# Patient Record
Sex: MALE | Race: OTHER | NOT HISPANIC OR LATINO
[De-identification: names, ages, dates, MRNs, and addresses within clinical notes are randomized per-mention and may not be internally consistent; named-entity substitution may affect disease eponyms.]

---

## 2017-01-20 ENCOUNTER — APPOINTMENT (OUTPATIENT)
Dept: OPHTHALMOLOGY | Facility: CLINIC | Age: 77
End: 2017-01-20

## 2017-01-20 RX ORDER — METHOTREXATE 2.5 MG/1
2.5 TABLET ORAL
Qty: 20 | Refills: 3 | Status: ACTIVE | COMMUNITY
Start: 2017-01-20 | End: 1900-01-01

## 2017-01-20 RX ORDER — TIMOLOL MALEATE 5 MG/ML
0.5 SOLUTION OPHTHALMIC
Qty: 1 | Refills: 5 | Status: ACTIVE | COMMUNITY
Start: 2017-01-20 | End: 1900-01-01

## 2017-01-23 ENCOUNTER — RX RENEWAL (OUTPATIENT)
Age: 77
End: 2017-01-23

## 2017-01-24 ENCOUNTER — RX RENEWAL (OUTPATIENT)
Age: 77
End: 2017-01-24

## 2017-01-24 RX ORDER — METHOTREXATE 2.5 MG/1
2.5 TABLET ORAL
Qty: 20 | Refills: 3 | Status: ACTIVE | COMMUNITY
Start: 2017-01-24 | End: 1900-01-01

## 2017-02-27 ENCOUNTER — APPOINTMENT (OUTPATIENT)
Dept: OPHTHALMOLOGY | Facility: CLINIC | Age: 77
End: 2017-02-27

## 2017-03-27 ENCOUNTER — APPOINTMENT (OUTPATIENT)
Dept: OPHTHALMOLOGY | Facility: CLINIC | Age: 77
End: 2017-03-27

## 2017-05-01 ENCOUNTER — APPOINTMENT (OUTPATIENT)
Dept: OPHTHALMOLOGY | Facility: CLINIC | Age: 77
End: 2017-05-01

## 2017-06-05 ENCOUNTER — APPOINTMENT (OUTPATIENT)
Dept: OPHTHALMOLOGY | Facility: CLINIC | Age: 77
End: 2017-06-05

## 2017-07-17 ENCOUNTER — APPOINTMENT (OUTPATIENT)
Dept: OPHTHALMOLOGY | Facility: CLINIC | Age: 77
End: 2017-07-17

## 2017-07-19 ENCOUNTER — OUTPATIENT (OUTPATIENT)
Dept: OUTPATIENT SERVICES | Facility: HOSPITAL | Age: 77
LOS: 1 days | Discharge: HOME | End: 2017-07-19

## 2017-07-19 ENCOUNTER — APPOINTMENT (OUTPATIENT)
Dept: OPHTHALMOLOGY | Facility: AMBULATORY SURGERY CENTER | Age: 77
End: 2017-07-19

## 2017-07-20 ENCOUNTER — APPOINTMENT (OUTPATIENT)
Dept: OPHTHALMOLOGY | Facility: CLINIC | Age: 77
End: 2017-07-20

## 2017-08-18 ENCOUNTER — APPOINTMENT (OUTPATIENT)
Dept: OPHTHALMOLOGY | Facility: CLINIC | Age: 77
End: 2017-08-18
Payer: MEDICARE

## 2017-08-18 PROCEDURE — 92012 INTRM OPH EXAM EST PATIENT: CPT

## 2017-09-05 ENCOUNTER — APPOINTMENT (OUTPATIENT)
Dept: OPHTHALMOLOGY | Facility: AMBULATORY SURGERY CENTER | Age: 77
End: 2017-09-05
Payer: MEDICARE

## 2017-09-05 ENCOUNTER — OUTPATIENT (OUTPATIENT)
Dept: OUTPATIENT SERVICES | Facility: HOSPITAL | Age: 77
LOS: 1 days | Discharge: HOME | End: 2017-09-05

## 2017-09-05 PROCEDURE — 66984 XCAPSL CTRC RMVL W/O ECP: CPT | Mod: RT

## 2017-09-05 PROCEDURE — 68340 SEPARATE EYELID ADHESIONS: CPT | Mod: RT

## 2017-09-06 ENCOUNTER — APPOINTMENT (OUTPATIENT)
Dept: OPHTHALMOLOGY | Facility: CLINIC | Age: 77
End: 2017-09-06
Payer: MEDICARE

## 2017-09-06 PROCEDURE — 99024 POSTOP FOLLOW-UP VISIT: CPT

## 2017-09-06 RX ORDER — PREDNISOLONE ACETATE 10 MG/ML
1 SUSPENSION/ DROPS OPHTHALMIC 4 TIMES DAILY
Qty: 1 | Refills: 5 | Status: ACTIVE | COMMUNITY
Start: 2017-09-06 | End: 1900-01-01

## 2017-09-06 RX ORDER — MOXIFLOXACIN OPHTHALMIC 5 MG/ML
0.5 SOLUTION/ DROPS OPHTHALMIC 3 TIMES DAILY
Qty: 1 | Refills: 5 | Status: ACTIVE | COMMUNITY
Start: 2017-09-06 | End: 1900-01-01

## 2017-09-08 DIAGNOSIS — Z79.84 LONG TERM (CURRENT) USE OF ORAL HYPOGLYCEMIC DRUGS: ICD-10-CM

## 2017-09-08 DIAGNOSIS — H11.231 SYMBLEPHARON, RIGHT EYE: ICD-10-CM

## 2017-09-08 DIAGNOSIS — L12.1 CICATRICIAL PEMPHIGOID: ICD-10-CM

## 2017-09-08 DIAGNOSIS — E11.36 TYPE 2 DIABETES MELLITUS WITH DIABETIC CATARACT: ICD-10-CM

## 2017-09-21 ENCOUNTER — APPOINTMENT (OUTPATIENT)
Dept: OPHTHALMOLOGY | Facility: CLINIC | Age: 77
End: 2017-09-21
Payer: MEDICARE

## 2017-09-21 PROCEDURE — 99024 POSTOP FOLLOW-UP VISIT: CPT

## 2017-09-21 RX ORDER — METHOTREXATE 2.5 MG/1
2.5 TABLET ORAL
Qty: 20 | Refills: 3 | Status: ACTIVE | COMMUNITY
Start: 2017-01-23 | End: 1900-01-01

## 2017-10-12 ENCOUNTER — APPOINTMENT (OUTPATIENT)
Dept: OPHTHALMOLOGY | Facility: CLINIC | Age: 77
End: 2017-10-12
Payer: MEDICARE

## 2017-10-12 PROCEDURE — 99024 POSTOP FOLLOW-UP VISIT: CPT

## 2017-11-09 ENCOUNTER — APPOINTMENT (OUTPATIENT)
Dept: OPHTHALMOLOGY | Facility: CLINIC | Age: 77
End: 2017-11-09
Payer: MEDICARE

## 2017-11-09 PROCEDURE — 99024 POSTOP FOLLOW-UP VISIT: CPT

## 2017-12-08 ENCOUNTER — APPOINTMENT (OUTPATIENT)
Dept: OPHTHALMOLOGY | Facility: CLINIC | Age: 77
End: 2017-12-08
Payer: MEDICARE

## 2017-12-08 DIAGNOSIS — H26.9 UNSPECIFIED CATARACT: ICD-10-CM

## 2017-12-08 PROCEDURE — 67820 REVISE EYELASHES: CPT | Mod: 50

## 2017-12-08 PROCEDURE — 92014 COMPRE OPH EXAM EST PT 1/>: CPT | Mod: 25

## 2017-12-11 PROBLEM — H26.9 CATARACT, BILATERAL: Status: RESOLVED | Noted: 2017-01-20 | Resolved: 2017-12-11

## 2018-01-05 ENCOUNTER — APPOINTMENT (OUTPATIENT)
Dept: OPHTHALMOLOGY | Facility: CLINIC | Age: 78
End: 2018-01-05
Payer: MEDICARE

## 2018-01-05 PROCEDURE — 92012 INTRM OPH EXAM EST PATIENT: CPT | Mod: 25

## 2018-01-05 PROCEDURE — 67820 REVISE EYELASHES: CPT | Mod: 50

## 2018-02-02 ENCOUNTER — APPOINTMENT (OUTPATIENT)
Dept: OPHTHALMOLOGY | Facility: CLINIC | Age: 78
End: 2018-02-02
Payer: MEDICARE

## 2018-02-02 PROCEDURE — 67820 REVISE EYELASHES: CPT | Mod: 50

## 2018-02-02 PROCEDURE — 92012 INTRM OPH EXAM EST PATIENT: CPT | Mod: 25

## 2018-03-02 ENCOUNTER — APPOINTMENT (OUTPATIENT)
Dept: OPHTHALMOLOGY | Facility: CLINIC | Age: 78
End: 2018-03-02
Payer: MEDICARE

## 2018-03-02 PROCEDURE — 92012 INTRM OPH EXAM EST PATIENT: CPT | Mod: 25

## 2018-03-02 PROCEDURE — 67820 REVISE EYELASHES: CPT | Mod: 50

## 2018-03-26 ENCOUNTER — APPOINTMENT (OUTPATIENT)
Dept: NEUROSURGERY | Facility: CLINIC | Age: 78
End: 2018-03-26
Payer: MEDICARE

## 2018-03-26 PROCEDURE — 99203 OFFICE O/P NEW LOW 30 MIN: CPT

## 2018-03-27 ENCOUNTER — INPATIENT (INPATIENT)
Facility: HOSPITAL | Age: 78
LOS: 2 days | Discharge: ANOTHER HOSPITAL | End: 2018-03-30
Attending: NEUROLOGICAL SURGERY

## 2018-03-27 VITALS
SYSTOLIC BLOOD PRESSURE: 166 MMHG | WEIGHT: 108.03 LBS | RESPIRATION RATE: 18 BRPM | HEIGHT: 60 IN | DIASTOLIC BLOOD PRESSURE: 69 MMHG | HEART RATE: 72 BPM | TEMPERATURE: 98 F

## 2018-03-27 DIAGNOSIS — S32.030G: ICD-10-CM

## 2018-03-27 RX ORDER — ACETAMINOPHEN 500 MG
650 TABLET ORAL EVERY 6 HOURS
Qty: 0 | Refills: 0 | Status: DISCONTINUED | OUTPATIENT
Start: 2018-03-27 | End: 2018-03-29

## 2018-03-27 RX ORDER — TIMOLOL 0.5 %
0 DROPS OPHTHALMIC (EYE)
Qty: 0 | Refills: 0 | COMMUNITY

## 2018-03-27 NOTE — H&P ADULT - NSHPPHYSICALEXAM_GEN_ALL_CORE
Vital Signs Last 24 Hrs  T(C): 36.5 (27 Mar 2018 20:12), Max: 36.5 (27 Mar 2018 20:12)  T(F): 97.7 (27 Mar 2018 20:12), Max: 97.7 (27 Mar 2018 20:12)  HR: 72 (27 Mar 2018 20:12) (72 - 72)  BP: 166/69 (27 Mar 2018 20:12) (166/69 - 166/69)  BP(mean): --  RR: 18 (27 Mar 2018 20:12) (18 - 18)  SpO2: --    PHYSICAL EXAM:    GENERAL: NAD, well-groomed, well-developed  HEAD:  Atraumatic, Normocephalic  EYES: EOMI, PERRLA, conjunctiva and sclera clear  NERVOUS SYSTEM:  Awake  alert oriented to self, place situation   Fund of knowledge, recent and remote memory are intact   Mood; normal affect   CN II-XII intact PERRRL, EOMI, no ptosis, no Nystagmus, normal shoulder shrug   Face symmetrical tongue is midline speech is clear and fluent  Motor: 5/5 UE/LE all muscle groups   Sensory: No deficit to light touch   Gait is not tested      EXTREMITIES:  2+ Peripheral Pulses, there is bilateral 1+ edema of legs      LABS:                RADIOLOGY & ADDITIONAL TESTS:

## 2018-03-27 NOTE — H&P ADULT - HISTORY OF PRESENT ILLNESS
Pt is 76 yo male with hx of Pemphygoid and long term corticosteroid use with osteopenia presents for elective repair of L3 spinal fx. Pt states on March 10, 2018 he attempted to lift his wife off of floor after she had a seizure.  When he attempted to lift her he felt sharp pain in lower back.  he went to his PMD and xrays showed he had a fx of his lumbar spine.  Since this time pt ambulates only short distances with cane.  Denies any urinary or fecal incontinence.  His pain has been controlled with tylenol every 8 hrs at home.

## 2018-03-28 LAB
ALBUMIN SERPL ELPH-MCNC: 3.1 G/DL — LOW (ref 3.5–5.2)
ALP SERPL-CCNC: 88 U/L — SIGNIFICANT CHANGE UP (ref 30–115)
ALT FLD-CCNC: 8 U/L — SIGNIFICANT CHANGE UP (ref 0–41)
ANION GAP SERPL CALC-SCNC: 9 MMOL/L — SIGNIFICANT CHANGE UP (ref 7–14)
APPEARANCE UR: CLEAR — SIGNIFICANT CHANGE UP
APTT BLD: 30.9 SEC — SIGNIFICANT CHANGE UP (ref 27–39.2)
AST SERPL-CCNC: 15 U/L — SIGNIFICANT CHANGE UP (ref 0–41)
BASOPHILS # BLD AUTO: 0.04 K/UL — SIGNIFICANT CHANGE UP (ref 0–0.2)
BASOPHILS NFR BLD AUTO: 0.4 % — SIGNIFICANT CHANGE UP (ref 0–1)
BILIRUB SERPL-MCNC: 0.3 MG/DL — SIGNIFICANT CHANGE UP (ref 0.2–1.2)
BILIRUB UR-MCNC: NEGATIVE — SIGNIFICANT CHANGE UP
BUN SERPL-MCNC: 10 MG/DL — SIGNIFICANT CHANGE UP (ref 10–20)
CALCIUM SERPL-MCNC: 8.1 MG/DL — LOW (ref 8.5–10.1)
CHLORIDE SERPL-SCNC: 102 MMOL/L — SIGNIFICANT CHANGE UP (ref 98–110)
CO2 SERPL-SCNC: 30 MMOL/L — SIGNIFICANT CHANGE UP (ref 17–32)
COLOR SPEC: YELLOW — SIGNIFICANT CHANGE UP
CREAT SERPL-MCNC: <0.5 MG/DL — LOW (ref 0.7–1.5)
DIFF PNL FLD: NEGATIVE — SIGNIFICANT CHANGE UP
EOSINOPHIL # BLD AUTO: 0.09 K/UL — SIGNIFICANT CHANGE UP (ref 0–0.7)
EOSINOPHIL NFR BLD AUTO: 0.9 % — SIGNIFICANT CHANGE UP (ref 0–8)
GLUCOSE SERPL-MCNC: 84 MG/DL — SIGNIFICANT CHANGE UP (ref 70–99)
GLUCOSE UR QL: NEGATIVE MG/DL — SIGNIFICANT CHANGE UP
HCT VFR BLD CALC: 37.8 % — LOW (ref 42–52)
HGB BLD-MCNC: 12.6 G/DL — LOW (ref 14–18)
IMM GRANULOCYTES NFR BLD AUTO: 0.5 % — HIGH (ref 0.1–0.3)
INR BLD: 1.11 RATIO — SIGNIFICANT CHANGE UP (ref 0.65–1.3)
KETONES UR-MCNC: 15
LEUKOCYTE ESTERASE UR-ACNC: NEGATIVE — SIGNIFICANT CHANGE UP
LYMPHOCYTES # BLD AUTO: 1.02 K/UL — LOW (ref 1.2–3.4)
LYMPHOCYTES # BLD AUTO: 10.3 % — LOW (ref 20.5–51.1)
MCHC RBC-ENTMCNC: 31.2 PG — HIGH (ref 27–31)
MCHC RBC-ENTMCNC: 33.3 G/DL — SIGNIFICANT CHANGE UP (ref 32–37)
MCV RBC AUTO: 93.6 FL — SIGNIFICANT CHANGE UP (ref 80–94)
MONOCYTES # BLD AUTO: 0.96 K/UL — HIGH (ref 0.1–0.6)
MONOCYTES NFR BLD AUTO: 9.7 % — HIGH (ref 1.7–9.3)
NEUTROPHILS # BLD AUTO: 7.75 K/UL — HIGH (ref 1.4–6.5)
NEUTROPHILS NFR BLD AUTO: 78.2 % — HIGH (ref 42.2–75.2)
NITRITE UR-MCNC: NEGATIVE — SIGNIFICANT CHANGE UP
PH UR: 7.5 — SIGNIFICANT CHANGE UP (ref 5–8)
PLATELET # BLD AUTO: 282 K/UL — SIGNIFICANT CHANGE UP (ref 130–400)
POTASSIUM SERPL-MCNC: 5.4 MMOL/L — HIGH (ref 3.5–5)
POTASSIUM SERPL-SCNC: 5.4 MMOL/L — HIGH (ref 3.5–5)
PROT SERPL-MCNC: 5.6 G/DL — LOW (ref 6–8)
PROT UR-MCNC: NEGATIVE MG/DL — SIGNIFICANT CHANGE UP
PROTHROM AB SERPL-ACNC: 12 SEC — SIGNIFICANT CHANGE UP (ref 9.95–12.87)
RBC # BLD: 4.04 M/UL — LOW (ref 4.7–6.1)
RBC # FLD: 14.6 % — HIGH (ref 11.5–14.5)
SODIUM SERPL-SCNC: 141 MMOL/L — SIGNIFICANT CHANGE UP (ref 135–146)
SP GR SPEC: 1.01 — SIGNIFICANT CHANGE UP (ref 1.01–1.03)
UROBILINOGEN FLD QL: 0.2 MG/DL — SIGNIFICANT CHANGE UP (ref 0.2–0.2)
WBC # BLD: 9.91 K/UL — SIGNIFICANT CHANGE UP (ref 4.8–10.8)
WBC # FLD AUTO: 9.91 K/UL — SIGNIFICANT CHANGE UP (ref 4.8–10.8)

## 2018-03-28 RX ORDER — HEPARIN SODIUM 5000 [USP'U]/ML
5000 INJECTION INTRAVENOUS; SUBCUTANEOUS EVERY 8 HOURS
Qty: 0 | Refills: 0 | Status: DISCONTINUED | OUTPATIENT
Start: 2018-03-28 | End: 2018-03-29

## 2018-03-28 RX ADMIN — Medication 650 MILLIGRAM(S): at 06:07

## 2018-03-28 RX ADMIN — HEPARIN SODIUM 5000 UNIT(S): 5000 INJECTION INTRAVENOUS; SUBCUTANEOUS at 15:21

## 2018-03-28 RX ADMIN — HEPARIN SODIUM 5000 UNIT(S): 5000 INJECTION INTRAVENOUS; SUBCUTANEOUS at 22:01

## 2018-03-28 RX ADMIN — Medication 650 MILLIGRAM(S): at 12:20

## 2018-03-28 RX ADMIN — Medication 650 MILLIGRAM(S): at 18:22

## 2018-03-28 NOTE — SWALLOW BEDSIDE ASSESSMENT ADULT - ASR SWALLOW RECOMMEND DIAG
SLP recommending instrumental swallow study however pt NPO for OR tomorrow. Outpatient information provided to daughter/VFSS/MBS/FEES

## 2018-03-28 NOTE — SWALLOW BEDSIDE ASSESSMENT ADULT - SWALLOW EVAL: DIAGNOSIS
Mild-moderate oral dysphagia for nectar thick liquids and puree with min overt s/s aspiration/penetration. Pt with baseline cough. Suspected pharyngeal dysphagia for thin liquids

## 2018-03-28 NOTE — PRE-ANESTHESIA EVALUATION ADULT - NSDENTALSD_ENT_ALL_CORE
partial lower dentures loose teeth/partial lower dentures, 5 upper loose teeth, patient explained that teeth may fall out during anesthesia, verbalized understanding

## 2018-03-28 NOTE — CONSULT NOTE ADULT - SUBJECTIVE AND OBJECTIVE BOX
Pt is 78 yo male with hx of Pemphygoid on MTX 7.5mg /week, DM-2 controlled with diet, glaucoma on timolol and long term corticosteroid use with osteopenia presents for elective repair of L3 spinal fx. Daughter states on March 10, 2018 he attempted to lift his wife off of floor after she had a seizure, he felt sharp pain in lower back. on seeing his his PMD xrays revealed compression fx of his lumbar spine.  Since this time pt ambulates only short distances with cane.  Denies any urinary or fecal incontinence.  His pain has been controlled with tylenol every 8 hrs at home. He has new onset B/L LE swelling since 2-3 days. Denies any pain in the LE, CP, SOB, palpitations, nausea, vomiting, focal weakness. Denies any cardiac history. Not on any antiplatelet or AC's. has been having bouts fo dry cough since a few months, mostly on eating. Currently being evaluated by speech and swallow for oropharyngeal dysphagia. Will have a FEES test after surgery. Denies any fever, chills. Baseline patient is independent with his ADL.     PAST MEDICAL & SURGICAL HISTORY  PAST MEDICAL & SURGICAL HISTORY:  Osteoporosis, unspecified osteoporosis type, unspecified pathological fracture presence, DM-2, glaucoma, pemphigoid    SOCIAL HISTORY:  Non smoker , no ETOH abuse   ALLERGIES:  No Known Allergies    MEDICATIONS:  STANDING MEDICATIONS  heparin  Injectable 5000 Unit(s) SubCutaneous every 8 hours    PRN MEDICATIONS  acetaminophen   Tablet 650 milliGRAM(s) Oral every 6 hours PRN    VITALS:   T(F): 97.4  HR: 65  BP: 117/56  RR: 18  SpO2: --    LABS:                        12.6   9.91  )-----------( 282      ( 28 Mar 2018 01:28 )             37.8         141  |  102  |  10  ----------------------------<  84  5.4<H>   |  30  |  <0.5<L>    Ca    8.1<L>      28 Mar 2018 01:28    TPro  5.6<L>  /  Alb  3.1<L>  /  TBili  0.3  /  DBili  x   /  AST  15  /  ALT  8   /  AlkPhos  88      PT/INR - ( 28 Mar 2018 01:28 )   PT: 12.00 sec;   INR: 1.11 ratio         PTT - ( 28 Mar 2018 01:28 )  PTT:30.9 sec  Urinalysis Basic - ( 28 Mar 2018 04:30 )    Color: Yellow / Appearance: Clear / S.010 / pH: x  Gluc: x / Ketone: 15  / Bili: Negative / Urobili: 0.2 mg/dL   Blood: x / Protein: Negative mg/dL / Nitrite: Negative   Leuk Esterase: Negative / RBC: x / WBC x   Sq Epi: x / Non Sq Epi: x / Bacteria: x    EKG:   Normal sinus rhythm  Right bundle branch block  Left anterior fascicular block    PHYSICAL EXAM:  GEN: No acute distress  HEENT:   LUNGS: Clear to auscultation bilaterally   HEART: S1/S2 present. RRR.   ABD: Soft, non-tender, non-distended. Bowel sounds present  EXT:  NEURO: AAOX3 Pt is 78 yo male with hx of Pemphygoid on MTX 7.5mg /week, DM-2 controlled with diet, glaucoma on timolol and long term corticosteroid use with osteopenia presents for elective repair of L3 spinal fx. Daughter states on March 10, 2018 he attempted to lift his wife off of floor after she had a seizure, he felt sharp pain in lower back. on seeing his his PMD xrays revealed compression fx of his lumbar spine.  Since this time pt ambulates only short distances with cane.  Denies any urinary or fecal incontinence.  His pain has been controlled with tylenol every 8 hrs at home. He has new onset B/L LE swelling since 2-3 days. Denies any pain in the LE, CP, SOB, palpitations, nausea, vomiting, focal weakness. Denies any cardiac history. Not on any antiplatelet or AC's. has been having bouts fo dry cough since a few months, mostly on eating. Currently being evaluated by speech and swallow for oropharyngeal dysphagia. Will have a FEES test after surgery. Denies any fever, chills. Baseline patient is independent with his ADL.     PAST MEDICAL & SURGICAL HISTORY  PAST MEDICAL & SURGICAL HISTORY:  Osteoporosis, unspecified osteoporosis type, unspecified pathological fracture presence, DM-2, glaucoma, pemphigoid    SOCIAL HISTORY:  Non smoker , no ETOH abuse   ALLERGIES:  No Known Allergies    MEDICATIONS:  STANDING MEDICATIONS  heparin  Injectable 5000 Unit(s) SubCutaneous every 8 hours    PRN MEDICATIONS  acetaminophen   Tablet 650 milliGRAM(s) Oral every 6 hours PRN    VITALS:   T(F): 97.4  HR: 65  BP: 117/56  RR: 18  SpO2: --    LABS:                        12.6   9.91  )-----------( 282      ( 28 Mar 2018 01:28 )             37.8         141  |  102  |  10  ----------------------------<  84  5.4<H>   |  30  |  <0.5<L>    Ca    8.1<L>      28 Mar 2018 01:28    TPro  5.6<L>  /  Alb  3.1<L>  /  TBili  0.3  /  DBili  x   /  AST  15  /  ALT  8   /  AlkPhos  88      PT/INR - ( 28 Mar 2018 01:28 )   PT: 12.00 sec;   INR: 1.11 ratio         PTT - ( 28 Mar 2018 01:28 )  PTT:30.9 sec  Urinalysis Basic - ( 28 Mar 2018 04:30 )    Color: Yellow / Appearance: Clear / S.010 / pH: x  Gluc: x / Ketone: 15  / Bili: Negative / Urobili: 0.2 mg/dL   Blood: x / Protein: Negative mg/dL / Nitrite: Negative   Leuk Esterase: Negative / RBC: x / WBC x   Sq Epi: x / Non Sq Epi: x / Bacteria: x    EKG:   Normal sinus rhythm @ 63bpm  Right bundle branch block  Left anterior fascicular block    PHYSICAL EXAM:  GEN: AAOX3, sitting up in chair in no acute distress   LUNGS: Clear to auscultation bilaterally   HEART: S1/S2 present. RRR.   ABD: Soft, non-tender, non-distended. Bowel sounds present  EXT: B/L P. edema 2 +  NEURO: AAOX3  5/5 B/L LE   LS tenderness +

## 2018-03-28 NOTE — SWALLOW BEDSIDE ASSESSMENT ADULT - SPECIFY REASON(S)
Daughter at bedside reports pt with chronic baseline cough, however concerns about increased coughing during meals

## 2018-03-28 NOTE — SWALLOW BEDSIDE ASSESSMENT ADULT - PHARYNGEAL PHASE
Wet vocal quality post oral intake/Cough post oral intake cough x1/Cough post oral intake Cough post oral intake/cough x1

## 2018-03-28 NOTE — CONSULT NOTE ADULT - ASSESSMENT
78 y/o M with glaucoma, pemphigoid, DM-2 controlled with diet for elective L3 kyphoplasty under local anesthesia with sedation    1) B/L LE swelling / Bifasicular block /  Hyperkalemia (5.4, likely pseudohyperkalemia)    - ECHO for evaluation of cardiac function as outpt  - Repeat potassium level  - 76 y/o M with glaucoma, pemphigoid, DM-2 controlled with diet for elective L3 kyphoplasty under local anesthesia with sedation    # B/L LE swelling / Bifasicular block /  Hyperkalemia (5.4, likely pseudohyperkalemia)    - ECHO for evaluation of cardiac function as outpt  - Repeat potassium level 78 y/o M with glaucoma, pemphigoid, DM-2 controlled with diet for elective L3 kyphoplasty under local anesthesia with sedation    # B/L LE swelling / Bifasicular block /  Hyperkalemia (5.4, likely pseudohyperkalemia)    - Repeat potassium level  - ECHO

## 2018-03-28 NOTE — PROGRESS NOTE ADULT - SUBJECTIVE AND OBJECTIVE BOX
HD #2     L3 Compression Fx    Pt seen and examined at bedside. Pt c/o LBP. Denies Lower Extremity Radiculopathy, Parasthesias, Weakness. c/o dysphagia. Sts has been going on for the last 4 weeks.    Vital Signs Last 24 Hrs  T(C): 36.3 (28 Mar 2018 08:28), Max: 36.5 (27 Mar 2018 20:12)  T(F): 97.4 (28 Mar 2018 08:28), Max: 97.7 (27 Mar 2018 20:12)  HR: 65 (28 Mar 2018 08:28) (65 - 80)  BP: 117/56 (28 Mar 2018 08:28) (117/56 - 166/69)  BP(mean): --  RR: 18 (28 Mar 2018 08:28) (18 - 18)  SpO2: --    PHYSICAL EXAM:  5/5 B/L Lower Extremities  Sensation intact to light touch  No clonus  +Tenderness to palpation of Lspine    MEDICATIONS:  Antibiotics:    Neuro:  acetaminophen   Tablet 650 milliGRAM(s) Oral every 6 hours PRN    Anticoagulation:  heparin  Injectable 5000 Unit(s) SubCutaneous every 8 hours    LABS:                        12.6   9.91  )-----------( 282      ( 28 Mar 2018 01:28 )             37.8     28    141  |  102  |  10  ----------------------------<  84  5.4<H>   |  30  |  <0.5<L>    Ca    8.1<L>      28 Mar 2018 01:28    TPro  5.6<L>  /  Alb  3.1<L>  /  TBili  0.3  /  DBili  x   /  AST  15  /  ALT  8   /  AlkPhos  88  28    PT/INR - ( 28 Mar 2018 01:28 )   PT: 12.00 sec;   INR: 1.11 ratio         PTT - ( 28 Mar 2018 01:28 )  PTT:30.9 sec  Urinalysis Basic - ( 28 Mar 2018 04:30 )    Color: Yellow / Appearance: Clear / S.010 / pH: x  Gluc: x / Ketone: 15  / Bili: Negative / Urobili: 0.2 mg/dL   Blood: x / Protein: Negative mg/dL / Nitrite: Negative   Leuk Esterase: Negative / RBC: x / WBC x   Sq Epi: x / Non Sq Epi: x / Bacteria: x    Assessment:  As above    Plan:  Awaiting MRI T & Lspine  Hospitalist for Medical Clearance  Tentative OR tomorrow for L3 Kyphoplasty under local with sedation  Speech and swallow eval for dysphagia

## 2018-03-29 ENCOUNTER — RESULT REVIEW (OUTPATIENT)
Age: 78
End: 2018-03-29

## 2018-03-29 LAB
ANION GAP SERPL CALC-SCNC: 8 MMOL/L — SIGNIFICANT CHANGE UP (ref 7–14)
BUN SERPL-MCNC: 10 MG/DL — SIGNIFICANT CHANGE UP (ref 10–20)
CALCIUM SERPL-MCNC: 8.1 MG/DL — LOW (ref 8.5–10.1)
CHLORIDE SERPL-SCNC: 100 MMOL/L — SIGNIFICANT CHANGE UP (ref 98–110)
CO2 SERPL-SCNC: 31 MMOL/L — SIGNIFICANT CHANGE UP (ref 17–32)
CREAT SERPL-MCNC: <0.5 MG/DL — LOW (ref 0.7–1.5)
GLUCOSE SERPL-MCNC: 117 MG/DL — HIGH (ref 70–99)
POTASSIUM SERPL-MCNC: 5.4 MMOL/L — HIGH (ref 3.5–5)
POTASSIUM SERPL-SCNC: 5.4 MMOL/L — HIGH (ref 3.5–5)
SODIUM SERPL-SCNC: 139 MMOL/L — SIGNIFICANT CHANGE UP (ref 135–146)

## 2018-03-29 RX ORDER — TIMOLOL 0.5 %
1 DROPS OPHTHALMIC (EYE) AT BEDTIME
Qty: 0 | Refills: 0 | Status: DISCONTINUED | OUTPATIENT
Start: 2018-03-29 | End: 2018-03-30

## 2018-03-29 RX ORDER — OXYCODONE AND ACETAMINOPHEN 5; 325 MG/1; MG/1
2 TABLET ORAL EVERY 4 HOURS
Qty: 0 | Refills: 0 | Status: DISCONTINUED | OUTPATIENT
Start: 2018-03-29 | End: 2018-03-30

## 2018-03-29 RX ORDER — SODIUM CHLORIDE 9 MG/ML
1000 INJECTION, SOLUTION INTRAVENOUS
Qty: 0 | Refills: 0 | Status: DISCONTINUED | OUTPATIENT
Start: 2018-03-29 | End: 2018-03-29

## 2018-03-29 RX ORDER — ONDANSETRON 8 MG/1
4 TABLET, FILM COATED ORAL ONCE
Qty: 0 | Refills: 0 | Status: DISCONTINUED | OUTPATIENT
Start: 2018-03-29 | End: 2018-03-29

## 2018-03-29 RX ORDER — OXYCODONE AND ACETAMINOPHEN 5; 325 MG/1; MG/1
2 TABLET ORAL EVERY 6 HOURS
Qty: 0 | Refills: 0 | Status: DISCONTINUED | OUTPATIENT
Start: 2018-03-29 | End: 2018-03-29

## 2018-03-29 RX ORDER — ONDANSETRON 8 MG/1
4 TABLET, FILM COATED ORAL ONCE
Qty: 0 | Refills: 0 | Status: DISCONTINUED | OUTPATIENT
Start: 2018-03-29 | End: 2018-03-30

## 2018-03-29 RX ORDER — OXYCODONE AND ACETAMINOPHEN 5; 325 MG/1; MG/1
1 TABLET ORAL EVERY 4 HOURS
Qty: 0 | Refills: 0 | Status: DISCONTINUED | OUTPATIENT
Start: 2018-03-29 | End: 2018-03-30

## 2018-03-29 RX ORDER — SODIUM CHLORIDE 9 MG/ML
1000 INJECTION, SOLUTION INTRAVENOUS
Qty: 0 | Refills: 0 | Status: DISCONTINUED | OUTPATIENT
Start: 2018-03-29 | End: 2018-03-30

## 2018-03-29 RX ORDER — ACETAMINOPHEN 500 MG
650 TABLET ORAL EVERY 6 HOURS
Qty: 0 | Refills: 0 | Status: DISCONTINUED | OUTPATIENT
Start: 2018-03-29 | End: 2018-03-30

## 2018-03-29 RX ADMIN — Medication 1 DROP(S): at 21:26

## 2018-03-29 RX ADMIN — SODIUM CHLORIDE 75 MILLILITER(S): 9 INJECTION, SOLUTION INTRAVENOUS at 18:11

## 2018-03-29 RX ADMIN — Medication 650 MILLIGRAM(S): at 05:52

## 2018-03-29 NOTE — PHYSICAL THERAPY INITIAL EVALUATION ADULT - GENERAL OBSERVATIONS, REHAB EVAL
Time in: 320 Time out: 350 pm Chart reviewed. Pt. seen semirecline in No apparent distress, c/o post op pain

## 2018-03-29 NOTE — CONSULT NOTE ADULT - SUBJECTIVE AND OBJECTIVE BOX
HPI:  Pt is 76 yo male with hx of Pemphygoid and long term corticosteroid use with osteopenia presents for elective repair of L3 spinal fx. Pt states on March 10, 2018 he attempted to lift his wife off of floor after she had a seizure.  When he attempted to lift her he felt sharp pain in lower back.  he went to his PMD and xrays showed he had a fx of his lumbar spine.  Since this time pt ambulates only short distances with cane.  Denies any urinary or fecal incontinence.  His pain has been controlled with tylenol every 8 hrs at home. (27 Mar 2018 20:59). S/p L3 kyphoplasty on 3/29.      PAST MEDICAL & SURGICAL HISTORY:  Osteoporosis, unspecified osteoporosis type, unspecified pathological fracture presence      Hospital Course:    TODAY'S SUBJECTIVE & REVIEW OF SYMPTOMS:     Constitutional WNL   Cardio WNL   Resp WNL   GI WNL  Heme WNL  Endo WNL  Skin WNL  MSK back pain  Neuro WNL  Cognitive WNL  Psych WNL      MEDICATIONS  (STANDING):  ondansetron Injectable 4 milliGRAM(s) IV Push once  timolol 0.5% Solution 1 Drop(s) Both EYES at bedtime    MEDICATIONS  (PRN):  acetaminophen   Tablet 650 milliGRAM(s) Oral every 6 hours PRN For Temp greater than 38.5 C (101.3 F)  oxyCODONE    5 mG/acetaminophen 325 mG 1 Tablet(s) Oral every 4 hours PRN Mild Pain (1 - 3)  oxyCODONE    5 mG/acetaminophen 325 mG 2 Tablet(s) Oral every 4 hours PRN Moderate Pain (4 - 6)      FAMILY HISTORY:      Allergies    No Known Allergies    Intolerances        SOCIAL HISTORY:    [  ] Etoh  [  ] Smoking  [  ] Substance abuse     Home Environment:  [  ] Home Alone  [ x ] Lives with Family  [  ] Home Health Aid    Dwelling:  [x  ] Apartment  [  ] Private House  [  ] Adult Home  [  ] Skilled Nursing Facility      [  ] Short Term  [  ] Long Term  [  ] Stairs       Elevator [ x ]    FUNCTIONAL STATUS PTA: (Check all that apply)  Ambulation: [  x ]Independent    [  ] Dependent     [  ] Non-Ambulatory  Assistive Device: [  ] SA Cane  [  ]  Q Cane  [  ] Walker  [  ]  Wheelchair  ADL : [x  ] Independent  [  ]  Dependent       Vital Signs Last 24 Hrs  T(C): 37.7 (29 Mar 2018 16:19), Max: 37.7 (29 Mar 2018 12:12)  T(F): 99.9 (29 Mar 2018 16:19), Max: 99.9 (29 Mar 2018 12:12)  HR: 107 (29 Mar 2018 16:19) (83 - 107)  BP: 117/66 (29 Mar 2018 16:19) (103/63 - 138/69)  BP(mean): --  RR: 18 (29 Mar 2018 16:19) (16 - 18)  SpO2: 96% (29 Mar 2018 12:42) (96% - 97%)      PHYSICAL EXAM: Alert & Oriented X3  GENERAL: NAD, well-groomed, well-developed  HEAD:  Atraumatic, Normocephalic  CHEST/LUNG: Clear   HEART: Regular   ABDOMEN: Soft, Nontender  EXTREMITIES:  no calf tenderness    NERVOUS SYSTEM:  Cranial Nerves 2-12 intact [  ] Abnormal  [  ]  ROM: WFL all extremities [x  ]  Abnormal [  ]  Motor Strength: WFL all extremities  [ x ]  Abnormal [  ]  Sensation: intact to light touch [ x ] Abnormal [  ]  Reflexes: Symmetric [  ]  Abnormal [  ]    FUNCTIONAL STATUS:  Bed Mobility: Independent [  ]  Supervision [  ]  Needs Assistance [x  ]  N/A [  ]  Transfers: Independent [  ]  Supervision [  ]  Needs Assistance [ x ]  N/A [  ]   Ambulation: Independent [  ]  Supervision [  ]  Needs Assistance [ x ]  N/A [  ]  ADL: Independent [  ] Requires Assistance [  ] N/A [  ]      LABS:                        12.6   9.91  )-----------( 282      ( 28 Mar 2018 01:28 )             37.8     03-29    139  |  100  |  10  ----------------------------<  117<H>  5.4<H>   |  31  |  <0.5<L>    Ca    8.1<L>      29 Mar 2018 09:30    TPro  5.6<L>  /  Alb  3.1<L>  /  TBili  0.3  /  DBili  x   /  AST  15  /  ALT  8   /  AlkPhos  88  -28    PT/INR - ( 28 Mar 2018 01:28 )   PT: 12.00 sec;   INR: 1.11 ratio         PTT - ( 28 Mar 2018 01:28 )  PTT:30.9 sec  Urinalysis Basic - ( 28 Mar 2018 04:30 )    Color: Yellow / Appearance: Clear / S.010 / pH: x  Gluc: x / Ketone: 15  / Bili: Negative / Urobili: 0.2 mg/dL   Blood: x / Protein: Negative mg/dL / Nitrite: Negative   Leuk Esterase: Negative / RBC: x / WBC x   Sq Epi: x / Non Sq Epi: x / Bacteria: x        RADIOLOGY & ADDITIONAL STUDIES:    Assesment:

## 2018-03-29 NOTE — CONSULT NOTE ADULT - ASSESSMENT
IMPRESSION: Rehab of L3 comp fx, s/p kyphoplasty    PRECAUTIONS: [  ] Cardiac  [  ] Respiratory  [  ] Seizures [  ] Contact Isolation  [  ] Droplet Isolation  [  ] Other    Weight Bearing Status:     RECOMMENDATION:    Out of Bed to Chair     DVT/Decubiti Prophylaxis    REHAB PLAN:     [ x  ] Bedside P/T 3-5 times a week   [   ]   Bedside O/T  2-3 times a week             [   ] No Rehab Therapy Indicated                   [   ]  Speech Therapy   Conditioning/ROM                                    ADL  Bed Mobility                                               Conditioning/ROM  Transfers                                                     Bed Mobility  Sitting /Standing Balance                         Transfers                                        Gait Training                                               Sitting/Standing Balance  Stair Training [   ]Applicable                    Home equipment Eval                                                                        Splinting  [   ] Only      GOALS:   ADL   [   ]   Independent                    Transfers  [ x  ] Independent                          Ambulation  [ x  ] Independent     [  x  ] With device                            [   ]  CG                                                         [   ]  CG                                                                  [   ] CG                            [    ] Min A                                                   [   ] Min A                                                              [   ] Min  A          DISCHARGE PLAN:   [   ]  Good candidate for Intensive Rehabilitation/Hospital based-4A [facility]                                             Will tolerate 3hrs Intensive Rehab Daily                                       [    ]  Short Term Rehab in Skilled Nursing Facility                                       [ x   ]  Home with Outpatient or  services                                         [    ]  Possible Candidate for Intensive Hospital based Rehab

## 2018-03-29 NOTE — PROGRESS NOTE ADULT - SUBJECTIVE AND OBJECTIVE BOX
NEUROSURGERY POST OP NOTE:    S/P L3 Kyphoplasty    Pt seen and examined at bedside. Pt c/o some incisional pain. Sts improved from pre-op. Denies Lower extremity radiculopathy, parasthesias. Daughter sts pt with more difficulty swallowing now. Pt did not have GETA. Pt had local anesthesia with sedation.    T(C): 37.7 (03-29-18 @ 16:19), Max: 37.7 (03-29-18 @ 12:12)  HR: 107 (03-29-18 @ 16:19) (83 - 107)  BP: 117/66 (03-29-18 @ 16:19) (103/63 - 138/69)  RR: 18 (03-29-18 @ 16:19) (16 - 18)  SpO2: 96% (03-29-18 @ 12:42) (96% - 97%)      03-28-18 @ 07:01  -  03-29-18 @ 07:00  --------------------------------------------------------  IN: 0 mL / OUT: 350 mL / NET: -350 mL    03-29-18 @ 07:01  -  03-29-18 @ 16:21  --------------------------------------------------------  IN: 100 mL / OUT: 100 mL / NET: 0 mL        acetaminophen   Tablet 650 milliGRAM(s) Oral every 6 hours PRN  ondansetron Injectable 4 milliGRAM(s) IV Push once  oxyCODONE    5 mG/acetaminophen 325 mG 1 Tablet(s) Oral every 4 hours PRN  oxyCODONE    5 mG/acetaminophen 325 mG 2 Tablet(s) Oral every 4 hours PRN  timolol 0.5% Solution 1 Drop(s) Both EYES at bedtime      Exam:  Strength 5/5  Sensation intact to light touch  No clonus  Incisions C/D/I    Assessment:   As above    Plan:  PT  NPO per speech and swallow  Barium Swallow ordered for tomorrow.

## 2018-03-29 NOTE — CHART NOTE - NSCHARTNOTEFT_GEN_A_CORE
PACU ANESTHESIA ADMISSION NOTE      Procedure:   Post op diagnosis:      ____  Intubated  TV:______       Rate: ______      FiO2: ______    ____  Patent Airway    ____  Full return of protective reflexes    _x___  Full recovery from anesthesia / back to baseline     Vitals:   T99.9:           R: 12                 BP:  103/63                Sat:   96                P: 104      Mental Status:  ____x Awake   ____x_ Alert   _____ Drowsy   _____ Sedated    Nausea/Vomiting:  __x__ NO  ______Yes,   See Post - Op Orders          Pain Scale (0-10):  _____    Treatment: ____ None    _x___ See Post - Op/PCA Orders    Post - Operative Fluids:   ____ Oral   __x__ See Post - Op Orders    Plan: Discharge:   ____Home       __x___Floor     _____Critical Care    _____  Other:_________________    Comments:

## 2018-03-30 ENCOUNTER — TRANSCRIPTION ENCOUNTER (OUTPATIENT)
Age: 78
End: 2018-03-30

## 2018-03-30 VITALS
DIASTOLIC BLOOD PRESSURE: 64 MMHG | TEMPERATURE: 98 F | RESPIRATION RATE: 18 BRPM | HEART RATE: 74 BPM | SYSTOLIC BLOOD PRESSURE: 119 MMHG

## 2018-03-30 LAB — SURGICAL PATHOLOGY STUDY: SIGNIFICANT CHANGE UP

## 2018-03-30 RX ORDER — PANTOPRAZOLE SODIUM 20 MG/1
40 TABLET, DELAYED RELEASE ORAL DAILY
Qty: 0 | Refills: 0 | Status: DISCONTINUED | OUTPATIENT
Start: 2018-03-30 | End: 2018-03-30

## 2018-03-30 RX ORDER — DEXAMETHASONE 0.5 MG/5ML
6 ELIXIR ORAL ONCE
Qty: 0 | Refills: 0 | Status: COMPLETED | OUTPATIENT
Start: 2018-03-30 | End: 2018-03-30

## 2018-03-30 RX ORDER — ACETAMINOPHEN 500 MG
2 TABLET ORAL
Qty: 0 | Refills: 0 | COMMUNITY
Start: 2018-03-30

## 2018-03-30 RX ADMIN — SODIUM CHLORIDE 75 MILLILITER(S): 9 INJECTION, SOLUTION INTRAVENOUS at 06:59

## 2018-03-30 RX ADMIN — Medication 6 MILLIGRAM(S): at 12:01

## 2018-03-30 NOTE — DISCHARGE NOTE ADULT - NS AS ACTIVITY OBS
No Heavy lifting/straining/Walking-Indoors allowed/Do not drive or operate machinery/Walking-Outdoors allowed/Showering allowed

## 2018-03-30 NOTE — DISCHARGE NOTE ADULT - MEDICATION SUMMARY - MEDICATIONS TO TAKE
I will START or STAY ON the medications listed below when I get home from the hospital:    Methyltrexate  -- 2.5 milligram(s) enteral once a week  -- Indication: For home med    acetaminophen 325 mg oral tablet  -- 2 tab(s) by mouth every 6 hours, As needed, For Temp greater than 38.5 C (101.3 F)  -- Indication: For pain    timolol hemihydrate 0.5% ophthalmic solution  -- drop(s) to each affected eye 2 times a day  -- Indication: For home med

## 2018-03-30 NOTE — DISCHARGE NOTE ADULT - CARE PROVIDER_API CALL
Kanika Tyler), Neurological Surgery  [address] [place] [address] [address]  [place] [address]  Phone: [phone]  Fax: [phone]

## 2018-03-30 NOTE — DISCHARGE NOTE ADULT - PATIENT PORTAL LINK FT
You can access the [facility] Patient Portal, offered by [facility], by registering with the following website: [email or web address]

## 2018-03-30 NOTE — SWALLOW FEES ASSESSMENT ADULT - SLP PERTINENT HISTORY OF CURRENT PROBLEM
Pt admitted with lumbar spine fracture s/p sx 3/29. PMHx: Pemphigold (dx ~4 years ago). Pt and daughter report pt with difficulty swallowing for past several months

## 2018-03-30 NOTE — SWALLOW FEES ASSESSMENT ADULT - ROSENBEK'S PENETRATION ASPIRATION SCALE
(7) material passes glottis, visible subglottic residue remains despite patient’s response (aspiration)

## 2018-03-30 NOTE — DISCHARGE NOTE ADULT - HOSPITAL COURSE
HPI:  Pt is 76 yo male with hx of Pemphygoid and long term corticosteroid use with osteopenia presents for elective repair of L3 spinal fx. Pt states on March 10, 2018 he attempted to lift his wife off of floor after she had a seizure.  When he attempted to lift her he felt sharp pain in lower back.  he went to his PMD and xrays showed he had a fx of his lumbar spine.  Since this time pt ambulates only short distances with cane.  Denies any urinary or fecal incontinence.  His pain has been controlled with tylenol every 8 hrs at home. (27 Mar 2018 20:59)  Pt is s/p L3 kyphoplasty and admits that pain has improved since post operatively. Pt denies any radicular pain or paresthesias in the lower extremities. Pt has chronic problems with swallowing and has scope evaluations every 4 months as an outpatient. Pt was seen by SLP here and had a FEEs which showed a lot of swelling and was made NPO. ENT consulted who recommended giving steroid dose. PT and daughter decided they wanted to leave this hospital and go to [place] to follow up with their own ENT doctor. Plan to enter [facility] through ER.

## 2018-03-30 NOTE — PROGRESS NOTE ADULT - SUBJECTIVE AND OBJECTIVE BOX
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 Pt c/o some incisional pain. Sts improved from pre-op. Denies Lower extremity radiculopathy, parasthesias. Pt complaining of continued swallowing difficulty so FEEs with SLP today. Pt did not have GETA. Pt had local anesthesia with sedation.    T(C): 36.6 (03-30-18 @ 07:30), Max: 37.7 (03-29-18 @ 12:12)  HR: 74 (03-30-18 @ 07:30) (71 - 107)  BP: 119/64 (03-30-18 @ 07:30) (103/63 - 138/69)  RR: 18 (03-30-18 @ 07:30) (16 - 18)  SpO2: 96% (03-29-18 @ 12:42) (96% - 97%)    Exam:  AAOX3. Verbal function intact  tongue midline, facial motions symmetric  Strength 5/5  Sensation intact to light touch  No clonus  Incisions C/D/I        03-29    139  |  100  |  10  ----------------------------<  117<H>  5.4<H>   |  31  |  <0.5<L>    Ca    8.1<L>      29 Mar 2018 09:30          Assessment/Plan: as above  repeat BMP, CBC  FEEs done with SLP and a lot of edema seen, called ENT to review scope as well  rec NPO, Steroids x1, protonix  pt/rehab  pasin control  d/w attending

## 2018-03-30 NOTE — DISCHARGE NOTE ADULT - CARE PLAN
Principal Discharge DX:	Closed compression fracture of L3 lumbar vertebra with delayed healing, subsequent encounter  Goal:	s/p l3 kyphoplasty  Assessment and plan of treatment:	s/p [id number]

## 2018-03-30 NOTE — SWALLOW FEES ASSESSMENT ADULT - PRELIMINARY ENDOSCOPIC EXAMINATIONS
Vocal fold edema/Interarytenoid/Arytenoid erythema/Pt with severe edema/erythema throuhgout pharynx. B/L arytenoid edema, unable to visualize TVC 2' severe edema./Interarytenoid/Arytenoid edema/Baseline penetration of secretions/Baseline aspiration of secretions

## 2018-03-30 NOTE — CONSULT NOTE ADULT - ASSESSMENT
77 y.o male with h/o pemphigoid with acute dysphagia.    ·	can give 1 dose of decadron  ·	can re-FFL later or tomorrow in AM if pt still IP  ·	no acute ENT intervention  ·	w/d with attng, will follow

## 2018-03-30 NOTE — SWALLOW FEES ASSESSMENT ADULT - INTEGRITY OF THE CRICOPHARYNGEAL OPENING
UES does not stay open long enough to clear bolus. Pt aspirates residue after swallow/No No/UES does not stay open long enough to clear bolus. Pt aspirates residue after swallow

## 2018-03-30 NOTE — CONSULT NOTE ADULT - SUBJECTIVE AND OBJECTIVE BOX
Pt is a 77 y.o male admitted for routine kyphoplasty by Dr Tyler- called to evaluate dysphagia. Pt underwent a kyphoplasty for L3 compression fx s/p fall after trying to help his wife following her seizing, pt was not orally intubated for procedure - done under local anesthesia. PT states he follows with an ENT every 4 months Pt is a 77 y.o male admitted for routine kyphoplasty by Dr Tyler- called to evaluate dysphagia. Pt underwent a kyphoplasty for L3 compression fx s/p fall after trying to help his wife following her seizing, pt was not orally intubated for procedure - done under local anesthesia. PT states he follows with an ENT every 4 months for laryngoscopy - always noted to have a "narrow airway". Pt has had prednisone at home in case of an acute airway flare up - but hasnt had to take anything recently. No SOB or diff breathing now.    PAST MEDICAL & SURGICAL HISTORY:  Osteoporosis, unspecified osteoporosis type, unspecified pathological fracture presence  Home Medications:  acetaminophen 325 mg oral tablet: 2 tab(s) orally every 6 hours, As needed, For Temp greater than 38.5 C (101.3 F) (30 Mar 2018 14:25)  Methyltrexate: 2.5 milligram(s) enteral once a week (27 Mar 2018 21:10)  timolol hemihydrate 0.5% ophthalmic solution: drop(s) to each affected eye 2 times a day (27 Mar 2018 21:10)  MEDICATIONS  (STANDING):  dextrose 5% + sodium chloride 0.9%. 1000 milliLiter(s) (75 mL/Hr) IV Continuous <Continuous>  ondansetron Injectable 4 milliGRAM(s) IV Push once  pantoprazole  Injectable 40 milliGRAM(s) IV Push daily  timolol 0.5% Solution 1 Drop(s) Both EYES at bedtime  Allergies    No Known Allergies    Intolerances    Vital Signs: T(F): 97.9 (30 Mar 2018 07:30), Max: 99.4 (30 Mar 2018 00:20), HR: 74 , BP: 119/64 , RR: 18  GEN: NAD, awake and alert. no drooling or pooling of secretions, voice clear  HEENT: NC/AT, oral mucosa pink, uvula midline, no erythema/edema. neck supple.  FEES video watched - airway narrow, but patent, epiglottis somewhat pushed forward, unclear if this is his baseline. TVC difficult to see, does have arytnoid edema b/l

## 2018-04-03 ENCOUNTER — APPOINTMENT (OUTPATIENT)
Dept: OPHTHALMOLOGY | Facility: CLINIC | Age: 78
End: 2018-04-03

## 2018-04-05 DIAGNOSIS — I45.2 BIFASCICULAR BLOCK: ICD-10-CM

## 2018-04-05 DIAGNOSIS — E87.5 HYPERKALEMIA: ICD-10-CM

## 2018-04-05 DIAGNOSIS — E11.9 TYPE 2 DIABETES MELLITUS WITHOUT COMPLICATIONS: ICD-10-CM

## 2018-04-05 DIAGNOSIS — M80.08XA AGE-RELATED OSTEOPOROSIS WITH CURRENT PATHOLOGICAL FRACTURE, VERTEBRA(E), INITIAL ENCOUNTER FOR FRACTURE: ICD-10-CM

## 2018-04-05 DIAGNOSIS — R13.10 DYSPHAGIA, UNSPECIFIED: ICD-10-CM

## 2018-05-24 ENCOUNTER — APPOINTMENT (OUTPATIENT)
Dept: OPHTHALMOLOGY | Facility: CLINIC | Age: 78
End: 2018-05-24
Payer: MEDICARE

## 2018-05-24 PROCEDURE — 92012 INTRM OPH EXAM EST PATIENT: CPT | Mod: 25

## 2018-05-24 PROCEDURE — 67820 REVISE EYELASHES: CPT | Mod: 50

## 2018-06-22 ENCOUNTER — APPOINTMENT (OUTPATIENT)
Dept: OPHTHALMOLOGY | Facility: CLINIC | Age: 78
End: 2018-06-22
Payer: MEDICARE

## 2018-06-22 PROCEDURE — 92012 INTRM OPH EXAM EST PATIENT: CPT | Mod: 25

## 2018-06-22 PROCEDURE — 67820 REVISE EYELASHES: CPT | Mod: 50

## 2018-07-20 ENCOUNTER — APPOINTMENT (OUTPATIENT)
Dept: OPHTHALMOLOGY | Facility: CLINIC | Age: 78
End: 2018-07-20
Payer: MEDICARE

## 2018-07-20 PROBLEM — M81.0 AGE-RELATED OSTEOPOROSIS WITHOUT CURRENT PATHOLOGICAL FRACTURE: Chronic | Status: ACTIVE | Noted: 2018-03-27

## 2018-07-20 PROCEDURE — 92012 INTRM OPH EXAM EST PATIENT: CPT | Mod: 25

## 2018-07-20 PROCEDURE — 67820 REVISE EYELASHES: CPT | Mod: 50

## 2018-08-17 ENCOUNTER — APPOINTMENT (OUTPATIENT)
Dept: OPHTHALMOLOGY | Facility: CLINIC | Age: 78
End: 2018-08-17
Payer: MEDICARE

## 2018-08-17 PROCEDURE — 92012 INTRM OPH EXAM EST PATIENT: CPT

## 2018-09-04 ENCOUNTER — RX RENEWAL (OUTPATIENT)
Age: 78
End: 2018-09-04

## 2018-09-04 RX ORDER — TIMOLOL MALEATE 5 MG/ML
0.5 SOLUTION OPHTHALMIC TWICE DAILY
Qty: 1 | Refills: 5 | Status: ACTIVE | COMMUNITY
Start: 2017-01-24 | End: 1900-01-01

## 2018-09-20 ENCOUNTER — APPOINTMENT (OUTPATIENT)
Dept: OPHTHALMOLOGY | Facility: CLINIC | Age: 78
End: 2018-09-20
Payer: MEDICARE

## 2018-09-20 PROCEDURE — 92012 INTRM OPH EXAM EST PATIENT: CPT

## 2018-10-09 ENCOUNTER — APPOINTMENT (OUTPATIENT)
Dept: OPHTHALMOLOGY | Facility: CLINIC | Age: 78
End: 2018-10-09

## 2018-10-11 ENCOUNTER — APPOINTMENT (OUTPATIENT)
Dept: OPHTHALMOLOGY | Facility: CLINIC | Age: 78
End: 2018-10-11
Payer: MEDICARE

## 2018-10-11 PROCEDURE — 67820 REVISE EYELASHES: CPT | Mod: 50

## 2018-10-11 PROCEDURE — 92012 INTRM OPH EXAM EST PATIENT: CPT | Mod: 25

## 2018-11-12 ENCOUNTER — APPOINTMENT (OUTPATIENT)
Dept: OPHTHALMOLOGY | Facility: CLINIC | Age: 78
End: 2018-11-12
Payer: MEDICARE

## 2018-11-12 PROCEDURE — 92012 INTRM OPH EXAM EST PATIENT: CPT | Mod: 25

## 2018-11-12 PROCEDURE — 67820 REVISE EYELASHES: CPT | Mod: 50

## 2018-12-10 ENCOUNTER — APPOINTMENT (OUTPATIENT)
Dept: OPHTHALMOLOGY | Facility: CLINIC | Age: 78
End: 2018-12-10
Payer: MEDICARE

## 2018-12-10 PROCEDURE — 92012 INTRM OPH EXAM EST PATIENT: CPT

## 2019-01-07 ENCOUNTER — APPOINTMENT (OUTPATIENT)
Dept: OPHTHALMOLOGY | Facility: CLINIC | Age: 79
End: 2019-01-07
Payer: MEDICARE

## 2019-01-07 DIAGNOSIS — J37.0 CHRONIC LARYNGITIS: ICD-10-CM

## 2019-01-07 PROCEDURE — 92014 COMPRE OPH EXAM EST PT 1/>: CPT | Mod: 25

## 2019-01-07 PROCEDURE — 67820 REVISE EYELASHES: CPT | Mod: 50

## 2019-02-04 ENCOUNTER — APPOINTMENT (OUTPATIENT)
Dept: OPHTHALMOLOGY | Facility: CLINIC | Age: 79
End: 2019-02-04
Payer: MEDICARE

## 2019-02-04 PROCEDURE — 67820 REVISE EYELASHES: CPT | Mod: 50

## 2019-02-04 PROCEDURE — 92012 INTRM OPH EXAM EST PATIENT: CPT | Mod: 25

## 2019-03-04 ENCOUNTER — APPOINTMENT (OUTPATIENT)
Dept: OPHTHALMOLOGY | Facility: CLINIC | Age: 79
End: 2019-03-04
Payer: MEDICARE

## 2019-03-04 PROCEDURE — 92012 INTRM OPH EXAM EST PATIENT: CPT

## 2019-04-04 ENCOUNTER — APPOINTMENT (OUTPATIENT)
Dept: OPHTHALMOLOGY | Facility: CLINIC | Age: 79
End: 2019-04-04
Payer: MEDICARE

## 2019-04-04 PROCEDURE — 67820 REVISE EYELASHES: CPT | Mod: 50

## 2019-04-04 PROCEDURE — 92012 INTRM OPH EXAM EST PATIENT: CPT | Mod: 25

## 2019-04-18 ENCOUNTER — APPOINTMENT (OUTPATIENT)
Dept: OPHTHALMOLOGY | Facility: CLINIC | Age: 79
End: 2019-04-18
Payer: MEDICARE

## 2019-04-18 PROCEDURE — ZZZZZ: CPT

## 2019-05-13 ENCOUNTER — APPOINTMENT (OUTPATIENT)
Dept: OPHTHALMOLOGY | Facility: CLINIC | Age: 79
End: 2019-05-13
Payer: MEDICARE

## 2019-05-13 DIAGNOSIS — H02.056 TRICHIASIS WITHOUT ENTROPION LEFT EYE, UNSPECIFIED EYELID: ICD-10-CM

## 2019-05-13 DIAGNOSIS — H40.003 PREGLAUCOMA, UNSPECIFIED, BILATERAL: ICD-10-CM

## 2019-05-13 DIAGNOSIS — H26.9 UNSPECIFIED CATARACT: ICD-10-CM

## 2019-05-13 DIAGNOSIS — H02.053 TRICHIASIS WITHOUT ENTROPION RIGHT EYE, UNSPECIFIED EYELID: ICD-10-CM

## 2019-05-13 DIAGNOSIS — H11.231: ICD-10-CM

## 2019-05-13 DIAGNOSIS — Z96.1 PRESENCE OF INTRAOCULAR LENS: ICD-10-CM

## 2019-05-13 DIAGNOSIS — L12.1 CICATRICIAL PEMPHIGOID: ICD-10-CM

## 2019-05-13 PROCEDURE — 67820 REVISE EYELASHES: CPT | Mod: 50

## 2019-05-13 PROCEDURE — 92012 INTRM OPH EXAM EST PATIENT: CPT | Mod: 25

## 2019-06-10 ENCOUNTER — NON-APPOINTMENT (OUTPATIENT)
Age: 79
End: 2019-06-10

## 2019-06-10 ENCOUNTER — APPOINTMENT (OUTPATIENT)
Dept: OPHTHALMOLOGY | Facility: CLINIC | Age: 79
End: 2019-06-10
Payer: MEDICARE

## 2019-06-10 PROCEDURE — 67820 REVISE EYELASHES: CPT | Mod: 50

## 2019-06-10 PROCEDURE — 92012 INTRM OPH EXAM EST PATIENT: CPT | Mod: 25

## 2019-07-08 ENCOUNTER — NON-APPOINTMENT (OUTPATIENT)
Age: 79
End: 2019-07-08

## 2019-07-08 ENCOUNTER — APPOINTMENT (OUTPATIENT)
Dept: OPHTHALMOLOGY | Facility: CLINIC | Age: 79
End: 2019-07-08
Payer: MEDICARE

## 2019-07-08 PROCEDURE — 92012 INTRM OPH EXAM EST PATIENT: CPT | Mod: 25

## 2019-07-08 PROCEDURE — 67820 REVISE EYELASHES: CPT | Mod: 50

## 2019-08-16 ENCOUNTER — NON-APPOINTMENT (OUTPATIENT)
Age: 79
End: 2019-08-16

## 2019-08-16 ENCOUNTER — APPOINTMENT (OUTPATIENT)
Dept: OPHTHALMOLOGY | Facility: CLINIC | Age: 79
End: 2019-08-16
Payer: MEDICARE

## 2019-08-16 PROCEDURE — 92012 INTRM OPH EXAM EST PATIENT: CPT

## 2019-09-20 ENCOUNTER — APPOINTMENT (OUTPATIENT)
Dept: OPHTHALMOLOGY | Facility: CLINIC | Age: 79
End: 2019-09-20
Payer: MEDICARE

## 2019-09-20 ENCOUNTER — NON-APPOINTMENT (OUTPATIENT)
Age: 79
End: 2019-09-20

## 2019-09-20 PROCEDURE — 92083 EXTENDED VISUAL FIELD XM: CPT

## 2019-09-20 PROCEDURE — 92012 INTRM OPH EXAM EST PATIENT: CPT

## 2019-10-03 ENCOUNTER — APPOINTMENT (OUTPATIENT)
Dept: OPHTHALMOLOGY | Facility: CLINIC | Age: 79
End: 2019-10-03
Payer: MEDICARE

## 2019-10-03 ENCOUNTER — NON-APPOINTMENT (OUTPATIENT)
Age: 79
End: 2019-10-03

## 2019-10-03 PROCEDURE — 92012 INTRM OPH EXAM EST PATIENT: CPT

## 2019-10-28 ENCOUNTER — APPOINTMENT (OUTPATIENT)
Dept: OPHTHALMOLOGY | Facility: CLINIC | Age: 79
End: 2019-10-28
Payer: MEDICARE

## 2019-10-28 ENCOUNTER — NON-APPOINTMENT (OUTPATIENT)
Age: 79
End: 2019-10-28

## 2019-10-28 PROCEDURE — 67820 REVISE EYELASHES: CPT | Mod: 50

## 2019-10-28 PROCEDURE — 92012 INTRM OPH EXAM EST PATIENT: CPT | Mod: 25

## 2019-12-02 ENCOUNTER — NON-APPOINTMENT (OUTPATIENT)
Age: 79
End: 2019-12-02

## 2019-12-02 ENCOUNTER — APPOINTMENT (OUTPATIENT)
Dept: OPHTHALMOLOGY | Facility: CLINIC | Age: 79
End: 2019-12-02
Payer: MEDICARE

## 2019-12-02 PROCEDURE — 92012 INTRM OPH EXAM EST PATIENT: CPT | Mod: 25

## 2019-12-02 PROCEDURE — 67820 REVISE EYELASHES: CPT | Mod: RT

## 2020-01-02 ENCOUNTER — APPOINTMENT (OUTPATIENT)
Dept: OPHTHALMOLOGY | Facility: CLINIC | Age: 80
End: 2020-01-02
Payer: MEDICARE

## 2020-01-02 ENCOUNTER — NON-APPOINTMENT (OUTPATIENT)
Age: 80
End: 2020-01-02

## 2020-01-02 PROCEDURE — 92012 INTRM OPH EXAM EST PATIENT: CPT

## 2020-01-30 ENCOUNTER — APPOINTMENT (OUTPATIENT)
Dept: OPHTHALMOLOGY | Facility: CLINIC | Age: 80
End: 2020-01-30
Payer: MEDICARE

## 2020-01-30 ENCOUNTER — NON-APPOINTMENT (OUTPATIENT)
Age: 80
End: 2020-01-30

## 2020-01-30 PROCEDURE — 92012 INTRM OPH EXAM EST PATIENT: CPT

## 2020-03-02 ENCOUNTER — APPOINTMENT (OUTPATIENT)
Dept: OPHTHALMOLOGY | Facility: CLINIC | Age: 80
End: 2020-03-02
Payer: MEDICARE

## 2020-03-02 ENCOUNTER — NON-APPOINTMENT (OUTPATIENT)
Age: 80
End: 2020-03-02

## 2020-03-02 PROCEDURE — 92012 INTRM OPH EXAM EST PATIENT: CPT

## 2020-04-02 ENCOUNTER — NON-APPOINTMENT (OUTPATIENT)
Age: 80
End: 2020-04-02

## 2020-04-02 ENCOUNTER — APPOINTMENT (OUTPATIENT)
Dept: OPHTHALMOLOGY | Facility: CLINIC | Age: 80
End: 2020-04-02
Payer: MEDICARE

## 2020-04-02 PROCEDURE — 92012 INTRM OPH EXAM EST PATIENT: CPT | Mod: 25

## 2020-04-02 PROCEDURE — 67820 REVISE EYELASHES: CPT | Mod: RT

## 2020-05-07 ENCOUNTER — APPOINTMENT (OUTPATIENT)
Dept: OPHTHALMOLOGY | Facility: CLINIC | Age: 80
End: 2020-05-07
Payer: MEDICARE

## 2020-05-07 ENCOUNTER — NON-APPOINTMENT (OUTPATIENT)
Age: 80
End: 2020-05-07

## 2020-05-07 PROCEDURE — 67820 REVISE EYELASHES: CPT | Mod: 50

## 2020-05-07 PROCEDURE — 92012 INTRM OPH EXAM EST PATIENT: CPT | Mod: 25

## 2020-06-19 ENCOUNTER — APPOINTMENT (OUTPATIENT)
Dept: OPHTHALMOLOGY | Facility: CLINIC | Age: 80
End: 2020-06-19
Payer: MEDICARE

## 2020-06-19 ENCOUNTER — NON-APPOINTMENT (OUTPATIENT)
Age: 80
End: 2020-06-19

## 2020-06-19 PROCEDURE — 92012 INTRM OPH EXAM EST PATIENT: CPT | Mod: 25

## 2020-06-19 PROCEDURE — 67820 REVISE EYELASHES: CPT

## 2020-07-17 ENCOUNTER — NON-APPOINTMENT (OUTPATIENT)
Age: 80
End: 2020-07-17

## 2020-07-17 ENCOUNTER — APPOINTMENT (OUTPATIENT)
Dept: OPHTHALMOLOGY | Facility: CLINIC | Age: 80
End: 2020-07-17
Payer: MEDICARE

## 2020-07-17 PROCEDURE — 67820 REVISE EYELASHES: CPT | Mod: 50

## 2020-07-17 PROCEDURE — 92012 INTRM OPH EXAM EST PATIENT: CPT | Mod: 25

## 2020-08-14 ENCOUNTER — APPOINTMENT (OUTPATIENT)
Dept: OPHTHALMOLOGY | Facility: CLINIC | Age: 80
End: 2020-08-14
Payer: MEDICARE

## 2020-08-14 ENCOUNTER — NON-APPOINTMENT (OUTPATIENT)
Age: 80
End: 2020-08-14

## 2020-08-14 PROCEDURE — 92012 INTRM OPH EXAM EST PATIENT: CPT

## 2020-08-28 ENCOUNTER — NON-APPOINTMENT (OUTPATIENT)
Age: 80
End: 2020-08-28

## 2020-08-28 ENCOUNTER — APPOINTMENT (OUTPATIENT)
Dept: OPHTHALMOLOGY | Facility: CLINIC | Age: 80
End: 2020-08-28
Payer: MEDICARE

## 2020-08-28 PROCEDURE — 92012 INTRM OPH EXAM EST PATIENT: CPT

## 2020-09-04 ENCOUNTER — APPOINTMENT (OUTPATIENT)
Dept: OPHTHALMOLOGY | Facility: CLINIC | Age: 80
End: 2020-09-04
Payer: MEDICARE

## 2020-09-04 ENCOUNTER — NON-APPOINTMENT (OUTPATIENT)
Age: 80
End: 2020-09-04

## 2020-09-04 PROCEDURE — 92012 INTRM OPH EXAM EST PATIENT: CPT

## 2020-09-18 ENCOUNTER — APPOINTMENT (OUTPATIENT)
Dept: OPHTHALMOLOGY | Facility: CLINIC | Age: 80
End: 2020-09-18

## 2020-10-02 ENCOUNTER — APPOINTMENT (OUTPATIENT)
Dept: OPHTHALMOLOGY | Facility: CLINIC | Age: 80
End: 2020-10-02
Payer: MEDICARE

## 2020-10-02 ENCOUNTER — NON-APPOINTMENT (OUTPATIENT)
Age: 80
End: 2020-10-02

## 2020-10-02 PROCEDURE — 92012 INTRM OPH EXAM EST PATIENT: CPT

## 2020-10-30 ENCOUNTER — NON-APPOINTMENT (OUTPATIENT)
Age: 80
End: 2020-10-30

## 2020-10-30 ENCOUNTER — APPOINTMENT (OUTPATIENT)
Dept: OPHTHALMOLOGY | Facility: CLINIC | Age: 80
End: 2020-10-30
Payer: MEDICARE

## 2020-10-30 PROCEDURE — 92012 INTRM OPH EXAM EST PATIENT: CPT

## 2020-12-04 ENCOUNTER — NON-APPOINTMENT (OUTPATIENT)
Age: 80
End: 2020-12-04

## 2020-12-04 ENCOUNTER — APPOINTMENT (OUTPATIENT)
Dept: OPHTHALMOLOGY | Facility: CLINIC | Age: 80
End: 2020-12-04
Payer: MEDICARE

## 2020-12-04 PROCEDURE — 92012 INTRM OPH EXAM EST PATIENT: CPT | Mod: 25

## 2020-12-04 PROCEDURE — 67820 REVISE EYELASHES: CPT | Mod: 50

## 2021-01-08 ENCOUNTER — APPOINTMENT (OUTPATIENT)
Dept: OPHTHALMOLOGY | Facility: CLINIC | Age: 81
End: 2021-01-08
Payer: MEDICARE

## 2021-01-08 ENCOUNTER — NON-APPOINTMENT (OUTPATIENT)
Age: 81
End: 2021-01-08

## 2021-01-08 PROCEDURE — 67820 REVISE EYELASHES: CPT | Mod: 50

## 2021-01-08 PROCEDURE — 92012 INTRM OPH EXAM EST PATIENT: CPT | Mod: 25

## 2021-02-05 ENCOUNTER — APPOINTMENT (OUTPATIENT)
Dept: OPHTHALMOLOGY | Facility: CLINIC | Age: 81
End: 2021-02-05

## 2021-03-29 ENCOUNTER — APPOINTMENT (OUTPATIENT)
Dept: OPHTHALMOLOGY | Facility: CLINIC | Age: 81
End: 2021-03-29
Payer: MEDICARE

## 2021-03-29 ENCOUNTER — NON-APPOINTMENT (OUTPATIENT)
Age: 81
End: 2021-03-29

## 2021-03-29 PROCEDURE — 92012 INTRM OPH EXAM EST PATIENT: CPT

## 2021-04-22 ENCOUNTER — NON-APPOINTMENT (OUTPATIENT)
Age: 81
End: 2021-04-22

## 2021-04-22 ENCOUNTER — APPOINTMENT (OUTPATIENT)
Dept: OPHTHALMOLOGY | Facility: CLINIC | Age: 81
End: 2021-04-22
Payer: MEDICARE

## 2021-04-22 PROCEDURE — 92012 INTRM OPH EXAM EST PATIENT: CPT | Mod: 25

## 2021-04-22 PROCEDURE — 67820 REVISE EYELASHES: CPT | Mod: 50

## 2021-05-12 NOTE — BRIEF OPERATIVE NOTE - ESTIMATED BLOOD LOSS
0
Patient informed last visit with Dr Lewis was 5/4 so visit needed for xanax refill, Appointment scheduled for 0840 tomorrow, pt aware  
Marina
1

## 2021-05-14 ENCOUNTER — APPOINTMENT (OUTPATIENT)
Dept: OPHTHALMOLOGY | Facility: CLINIC | Age: 81
End: 2021-05-14

## 2021-05-27 ENCOUNTER — APPOINTMENT (OUTPATIENT)
Dept: OPHTHALMOLOGY | Facility: CLINIC | Age: 81
End: 2021-05-27
Payer: MEDICARE

## 2021-05-27 ENCOUNTER — NON-APPOINTMENT (OUTPATIENT)
Age: 81
End: 2021-05-27

## 2021-05-27 PROCEDURE — 92285 EXTERNAL OCULAR PHOTOGRAPHY: CPT

## 2021-05-27 PROCEDURE — 92012 INTRM OPH EXAM EST PATIENT: CPT

## 2021-07-02 ENCOUNTER — NON-APPOINTMENT (OUTPATIENT)
Age: 81
End: 2021-07-02

## 2021-07-02 ENCOUNTER — APPOINTMENT (OUTPATIENT)
Dept: OPHTHALMOLOGY | Facility: CLINIC | Age: 81
End: 2021-07-02
Payer: MEDICARE

## 2021-07-02 PROCEDURE — 92012 INTRM OPH EXAM EST PATIENT: CPT | Mod: 25

## 2021-07-02 PROCEDURE — 67820 REVISE EYELASHES: CPT | Mod: E2,E4

## 2021-07-30 ENCOUNTER — APPOINTMENT (OUTPATIENT)
Dept: OPHTHALMOLOGY | Facility: CLINIC | Age: 81
End: 2021-07-30
Payer: MEDICARE

## 2021-07-30 ENCOUNTER — NON-APPOINTMENT (OUTPATIENT)
Age: 81
End: 2021-07-30

## 2021-07-30 PROCEDURE — 67820 REVISE EYELASHES: CPT | Mod: 50

## 2021-07-30 PROCEDURE — 92012 INTRM OPH EXAM EST PATIENT: CPT | Mod: 25

## 2021-09-02 ENCOUNTER — NON-APPOINTMENT (OUTPATIENT)
Age: 81
End: 2021-09-02

## 2021-09-02 PROCEDURE — 67820 REVISE EYELASHES: CPT | Mod: 50

## 2021-09-02 PROCEDURE — 92012 INTRM OPH EXAM EST PATIENT: CPT | Mod: 25

## 2021-10-07 ENCOUNTER — NON-APPOINTMENT (OUTPATIENT)
Age: 81
End: 2021-10-07

## 2021-10-07 ENCOUNTER — APPOINTMENT (OUTPATIENT)
Dept: OPHTHALMOLOGY | Facility: CLINIC | Age: 81
End: 2021-10-07
Payer: MEDICARE

## 2021-10-07 PROCEDURE — 67820 REVISE EYELASHES: CPT | Mod: E2,E4

## 2021-10-07 PROCEDURE — 92285 EXTERNAL OCULAR PHOTOGRAPHY: CPT

## 2021-10-07 PROCEDURE — 92012 INTRM OPH EXAM EST PATIENT: CPT | Mod: 25

## 2021-11-08 ENCOUNTER — APPOINTMENT (OUTPATIENT)
Dept: OPHTHALMOLOGY | Facility: CLINIC | Age: 81
End: 2021-11-08
Payer: MEDICARE

## 2021-11-08 ENCOUNTER — NON-APPOINTMENT (OUTPATIENT)
Age: 81
End: 2021-11-08

## 2021-11-08 PROCEDURE — 92012 INTRM OPH EXAM EST PATIENT: CPT

## 2022-01-10 ENCOUNTER — APPOINTMENT (OUTPATIENT)
Dept: OPHTHALMOLOGY | Facility: CLINIC | Age: 82
End: 2022-01-10
Payer: MEDICARE

## 2022-01-10 ENCOUNTER — NON-APPOINTMENT (OUTPATIENT)
Age: 82
End: 2022-01-10

## 2022-01-10 PROCEDURE — 67820 REVISE EYELASHES: CPT | Mod: E2,E4

## 2022-01-10 PROCEDURE — 92012 INTRM OPH EXAM EST PATIENT: CPT | Mod: 25

## 2022-02-17 ENCOUNTER — NON-APPOINTMENT (OUTPATIENT)
Age: 82
End: 2022-02-17

## 2022-02-17 ENCOUNTER — APPOINTMENT (OUTPATIENT)
Dept: OPHTHALMOLOGY | Facility: CLINIC | Age: 82
End: 2022-02-17
Payer: MEDICARE

## 2022-02-17 PROCEDURE — 92012 INTRM OPH EXAM EST PATIENT: CPT | Mod: 25

## 2022-02-17 PROCEDURE — 67820 REVISE EYELASHES: CPT | Mod: E2,E4

## 2022-04-01 ENCOUNTER — NON-APPOINTMENT (OUTPATIENT)
Age: 82
End: 2022-04-01

## 2022-04-01 ENCOUNTER — APPOINTMENT (OUTPATIENT)
Dept: OPHTHALMOLOGY | Facility: CLINIC | Age: 82
End: 2022-04-01
Payer: MEDICARE

## 2022-04-01 PROCEDURE — 92012 INTRM OPH EXAM EST PATIENT: CPT

## 2022-04-24 NOTE — PHYSICAL THERAPY INITIAL EVALUATION ADULT - PHYSICAL ASSIST/NONPHYSICAL ASSIST, REHAB EVAL
Nephrology Daily Progress Note     Harsha Duke  Date of Service: 4/24/2022        RECENT EVENTS / SUBJECTIVE:     Sitting on edge of bed.  Comfortable, on RA.   Denies CP, SOB, dizziness.   Eager for discharge home, noted possible discharge home today.   Cr 1.29.         PMHx, Social Hx , Medications and Allergies reviewed.      ALLERGIES:   Allergen Reactions   • Bee Sting Other (See Comments)     Nausea vomiting, hearing loss       OBJECTIVE:    Vital signs over past 48 Hours:  Vital Last Value 24 Hour Range   Temp 97.8 °F (36.6 °C) (04/24/22 0431) Temp  Min: 97.4 °F (36.3 °C)  Max: 97.8 °F (36.6 °C)   Pulse 76 (04/24/22 0431) Pulse  Min: 65  Max: 83   Respiratory 16 (04/24/22 0431) Resp  Min: 16  Max: 16   Non-Invasive  Blood Pressure 98/64 (04/24/22 0921) BP  Min: 98/64  Max: 132/83   Arterial  Blood Pressure   No data recorded   Pulse Ox 97 % (04/24/22 0431) SpO2  Min: 95 %  Max: 100 %     Ht/Wt Today Admitted   Weight 81.1 kg (178 lb 12.7 oz) 92.9 kg (204 lb 12.9 oz)   Height  6' 1\" (185.4 cm)   BMI 23.59 27.02       Weight over past 48 Hours:  Patient Vitals for the past 48 hrs:   Weight   04/23/22 0438 81.7 kg (180 lb 1.6 oz)   04/24/22 0431 81.1 kg (178 lb 12.7 oz)     Weight change: -0.593 kg (-1 lb 4.9 oz)    Intake/Output this shift:  I/O this shift:  In: 120 [P.O.:120]  Out: -     Intake/Output Last 3 Shifts:  I/O last 3 completed shifts:  In: 1200 [P.O.:1200]  Out: 0     Vent settings for last 24 hours:       Hemodynamic parameters for last 24 hours:        Medications Prior to Admission   Medication Sig Dispense Refill   • acetaminophen (TYLENOL) 325 MG tablet Take 325 mg by mouth every 4 hours as needed for Pain or Fever.     • fluticasone (FLONASE) 50 MCG/ACT nasal spray Spray 1 spray in each nostril daily as needed.     • carbamide peroxide (Ear Drops) 6.5 % otic solution 5 drops as needed.     • budesonide-formoterol (Symbicort) 160-4.5 MCG/ACT inhaler Inhale 2 puffs into the lungs daily.  10.2 g 12   • albuterol 108 (90 Base) MCG/ACT inhaler Inhale 2 puffs into the lungs every 4 hours as needed for Shortness of Breath or Wheezing. 1 each 11   • buPROPion XL (WELLBUTRIN XL) 300 MG 24 hr tablet Take 1 tablet by mouth daily. 30 tablet 2   • lisinopril (ZESTRIL) 20 MG tablet Take 1 tablet by mouth daily. 90 tablet 0   • atorvastatin (LIPITOR) 20 MG tablet Take 1 tablet by mouth daily. 90 tablet 0   • fluoxetine (PROzac) 40 MG capsule Take 1 capsule by mouth daily. 90 capsule 0   • allopurinol (ZYLOPRIM) 100 MG tablet TAKE 1 TABLET BY MOUTH DAILY 90 tablet 1   • fluticasone (FLONASE) 50 MCG/ACT nasal spray Spray 2 sprays in each nostril daily.     • tamsulosin (FLOMAX) 0.4 MG Cap TAKE 1 CAPSULE BY MOUTH DAILY AFTER A MEAL 30 capsule 5   • clopidogrel (PLAVIX) 75 MG tablet TAKE 1 TABLET BY MOUTH DAILY 90 tablet 1   • divalproex (DEPAKOTE ER) 250 MG 24 hr ER tablet TAKE 1 TABLET BY MOUTH EVERY NIGHT AT BEDTIME 90 tablet 3   • Ascorbic Acid (VITAMIN C) 100 MG tablet Take 100 mg by mouth daily.     • aspirin 81 MG tablet Take 81 mg by mouth daily. Indications: Temporary Stroke      • Omega-3 Fatty Acids (FISH OIL) 1000 MG capsule Take 1 g by mouth daily.      • thiamine (VITAMIN B1) 100 MG tablet Take 1 tablet by mouth daily. 30 tablet 1   • vitamin - therapeutic multivitamins w/minerals (CENTRUM SILVER,THERA-M) Tab Take 1 tablet by mouth daily.         ALLERGIES:   Allergen Reactions   • Bee Sting Other (See Comments)     Nausea vomiting, hearing loss       Medications / Infusions  Scheduled:   • furosemide  40 mg Oral Daily   • metoPROLOL succinate  50 mg Oral BID   • polyethylene glycol  17 g Oral Daily   • docusate sodium  100 mg Oral Daily   • nicotine  1 patch Transdermal Daily   • AMIODarone  200 mg Oral Daily   • sacubitril-valsartan  1 tablet Oral BID   • finasteride  5 mg Oral Daily   • apixaBAN  5 mg Oral 2 times per day   • [Held by provider] spironolactone  25 mg Oral Daily   • empagliflozin  10  mg Oral Daily   • Magnesium Standard Replacement Protocol   Does not apply See Admin Instructions   • Potassium Standard Replacement Protocol   Does not apply See Admin Instructions   • allopurinol  100 mg Oral Daily   • aspirin  81 mg Oral Daily   • atorvastatin  20 mg Oral Daily   • budesonide-formoterol  2 puff Inhalation Daily Resp   • buPROPion XL  300 mg Oral Daily   • divalproex  250 mg Oral QHS   • FLUoxetine  40 mg Oral Daily   • tamsulosin  0.4 mg Oral Daily PC        Continuous Infusions:   • sodium chloride 0.9% infusion Stopped (04/16/22 0915)   • sodium chloride 0.9% infusion          PRN:   melatonin, metoPROLOL, acetaminophen, ondansetron, albuterol, sodium chloride, sodium chloride         Physical Exam  Gen  NAD  ENT  MMM, No Pallor or icterus  Neck supple, no JVD with trachea midline  Cardiac  RRR, S1, S2 no S3  Resp  CTA    ABD  Soft , non tender, no hepatosplenmegaly, BS normoactive  Ext  No edema   Neuro A+O X's 3       Laboratory Results     Recent Labs   Lab 04/24/22  0438 04/23/22  0505 04/22/22  0528 04/21/22  0534 04/20/22  0453 04/19/22  0442 04/18/22  0327   SODIUM 136 135 137 132* 134*   < > 136   POTASSIUM 4.2 5.3* 4.5 3.8 4.2   < > 4.1   CHLORIDE 105 107 107 103 103   < > 104   CO2 25 23 25 21 20*   < > 23   BUN 25* 25* 39* 57* 54*   < > 32*   CREATININE 1.29* 1.16 1.28* 1.93* 2.28*   < > 1.18*   GLUCOSE 95 84 96 106* 85   < > 101*   CALCIUM 8.9 8.8 8.6 8.6 9.2   < > 8.8   MG 2.3 2.3  --   --   --   --  2.3   PHOS  --  2.4  --   --   --   --   --    ALBUMIN 3.1* 3.0* 3.0* 2.8* 3.0*   < > 2.9*   AST 57* 54* 56* 59* 38*   < > 55*   GPT 79* 76* 82* 76* 62   < > 74*   ALKPT 96 91 91 95 102   < > 112   BILIRUBIN 0.7 0.4 0.5 0.6 0.8   < > 0.8    < > = values in this interval not displayed.       Anemia  Recent Labs   Lab 04/24/22  0438 04/23/22  0505 04/21/22  0534 04/19/22  0442 04/18/22  0327   WBC 6.1 6.0 6.0 7.7 7.4   HGB 15.9 15.4 15.7 17.5* 16.4   HCT 49.9 48.4 49.1 53.9* 51.1*   PLT  285 288 314 364 322     Recent Labs   Lab 04/20/22  1607   FERR 173   IRON 65   PST 18        Mineral & Bone Disorder  Recent Labs   Lab 04/24/22  0438 04/23/22  0505 04/22/22  0528 04/21/22  0534 04/20/22  1607 04/19/22  0442 04/18/22  0327   CALCIUM 8.9 8.8 8.6   < >  --    < > 8.8   PHOS  --  2.4  --   --   --   --   --    INTAC  --   --   --   --  94*  --   --    MG 2.3 2.3  --   --   --   --  2.3    < > = values in this interval not displayed.      No results found for: PTH    Urine Panel  Lab Results   Component Value Date    UKET Negative 04/20/2022    USPG 1.017 04/20/2022    UPROT Negative 04/20/2022    UWBC Negative 04/20/2022    URBC Large (A) 04/20/2022    UBILI Negative 04/20/2022    UPH 5.0 04/20/2022    UROB 1.0 04/20/2022    UBACTR none 09/18/2018          Assessment / Plan     Acute Kidney Injury, non-oliguric, in the setting of acute HFrEF exacerbation, diuresis, hypotension, recent hematuria and urinary retention (smith pulled 04/15)             -Avoid nephrotoxins, contrast, fleet enemas, NSAIDs             -Hold ACEi/ARB, and diuretics at this time             -Pharmacy to ensure medications adjusted for reduced CrCl              -Strict I&O, Daily weights             -Monitor weight trend     Chronic Kidney Disease, stage II             -Prior baseline CR 0.67-1.17~, now possibly ~1.2-1.4     -eGFR by Cystatin C 35 (04/20)     Hx of Hypertension / Now with hypotension, concern for orthostatic hypotension vs cardiorenal syndrome vs over diuresis             -Check orthostatic blood pressure, every shift             -Encourage mobility as tolerated, PT/OT following     HFrEF, LVEF 24%             -Cardiology following      A-Fib RVR                 -EP following      Urinary Retention / Hematuria / Nephrolithiasis             -Urology following              -Pt refusing cystoscopy            Recs     OK for discharge from nephrology standpoint.   To continue Lasix and Entresto.   Renal function  stable, BP's improved since yesterday.     RANDOLPH in the setting of over diuresis vs cardiorenal syndrome vs hemodynamic with hypotension vs obstructive uropathy - now resolved.   Creatinine stable at 1.29    Requiring straight cath at times   -Urology following  Daily standing weights, strict I/O's, renal diet and encourage PO intake   Avoid nephrotoxins, contrast, fleet enemas, NSAIDs  Pharmacy to ensure medications are adjusted for CrCl  Labs in AM.    D/w RN, patient, family member, Dr. Walter.    Dr. Her to resume care Monday morning.     KIMBER Bustamante  04/24/22    Use answering service [phone] after 4:30pm, before 8am, and weekends for on-call provider.             1 person assist

## 2022-04-29 ENCOUNTER — APPOINTMENT (OUTPATIENT)
Dept: OPHTHALMOLOGY | Facility: CLINIC | Age: 82
End: 2022-04-29
Payer: MEDICARE

## 2022-04-29 ENCOUNTER — NON-APPOINTMENT (OUTPATIENT)
Age: 82
End: 2022-04-29

## 2022-04-29 PROCEDURE — 92012 INTRM OPH EXAM EST PATIENT: CPT

## 2022-06-02 ENCOUNTER — NON-APPOINTMENT (OUTPATIENT)
Age: 82
End: 2022-06-02

## 2022-06-02 ENCOUNTER — APPOINTMENT (OUTPATIENT)
Dept: OPHTHALMOLOGY | Facility: CLINIC | Age: 82
End: 2022-06-02
Payer: MEDICARE

## 2022-06-02 PROCEDURE — 67820 REVISE EYELASHES: CPT | Mod: E2,E4

## 2022-06-02 PROCEDURE — 92012 INTRM OPH EXAM EST PATIENT: CPT | Mod: 25

## 2022-07-07 ENCOUNTER — APPOINTMENT (OUTPATIENT)
Dept: OPHTHALMOLOGY | Facility: CLINIC | Age: 82
End: 2022-07-07

## 2022-07-07 ENCOUNTER — NON-APPOINTMENT (OUTPATIENT)
Age: 82
End: 2022-07-07

## 2022-07-07 PROCEDURE — 92012 INTRM OPH EXAM EST PATIENT: CPT

## 2022-07-07 PROCEDURE — 92285 EXTERNAL OCULAR PHOTOGRAPHY: CPT

## 2022-08-12 ENCOUNTER — APPOINTMENT (OUTPATIENT)
Dept: OPHTHALMOLOGY | Facility: CLINIC | Age: 82
End: 2022-08-12

## 2022-08-12 ENCOUNTER — NON-APPOINTMENT (OUTPATIENT)
Age: 82
End: 2022-08-12

## 2022-08-12 PROCEDURE — 92012 INTRM OPH EXAM EST PATIENT: CPT

## 2022-09-12 ENCOUNTER — APPOINTMENT (OUTPATIENT)
Dept: OPHTHALMOLOGY | Facility: CLINIC | Age: 82
End: 2022-09-12

## 2022-09-12 ENCOUNTER — NON-APPOINTMENT (OUTPATIENT)
Age: 82
End: 2022-09-12

## 2022-09-12 PROCEDURE — 67820 REVISE EYELASHES: CPT | Mod: E2,E4

## 2022-09-12 PROCEDURE — 92012 INTRM OPH EXAM EST PATIENT: CPT | Mod: 25

## 2022-10-20 ENCOUNTER — NON-APPOINTMENT (OUTPATIENT)
Age: 82
End: 2022-10-20

## 2022-10-20 ENCOUNTER — APPOINTMENT (OUTPATIENT)
Dept: OPHTHALMOLOGY | Facility: CLINIC | Age: 82
End: 2022-10-20

## 2022-10-20 PROCEDURE — 92014 COMPRE OPH EXAM EST PT 1/>: CPT

## 2022-10-20 PROCEDURE — 92134 CPTRZ OPH DX IMG PST SGM RTA: CPT

## 2022-11-29 ENCOUNTER — APPOINTMENT (OUTPATIENT)
Dept: OPHTHALMOLOGY | Facility: CLINIC | Age: 82
End: 2022-11-29

## 2022-11-29 ENCOUNTER — NON-APPOINTMENT (OUTPATIENT)
Age: 82
End: 2022-11-29

## 2022-11-29 PROCEDURE — 92250 FUNDUS PHOTOGRAPHY W/I&R: CPT

## 2022-11-29 PROCEDURE — 67820 REVISE EYELASHES: CPT | Mod: E2

## 2022-11-29 PROCEDURE — 92012 INTRM OPH EXAM EST PATIENT: CPT | Mod: 25

## 2023-01-10 ENCOUNTER — APPOINTMENT (OUTPATIENT)
Dept: OPHTHALMOLOGY | Facility: CLINIC | Age: 83
End: 2023-01-10
Payer: MEDICARE

## 2023-01-10 ENCOUNTER — NON-APPOINTMENT (OUTPATIENT)
Age: 83
End: 2023-01-10

## 2023-01-10 PROCEDURE — 92285 EXTERNAL OCULAR PHOTOGRAPHY: CPT

## 2023-01-10 PROCEDURE — 92012 INTRM OPH EXAM EST PATIENT: CPT

## 2023-02-07 ENCOUNTER — APPOINTMENT (OUTPATIENT)
Dept: UROLOGY | Facility: CLINIC | Age: 83
End: 2023-02-07
Payer: MEDICARE

## 2023-02-07 VITALS
OXYGEN SATURATION: 95 % | SYSTOLIC BLOOD PRESSURE: 122 MMHG | HEART RATE: 65 BPM | DIASTOLIC BLOOD PRESSURE: 72 MMHG | TEMPERATURE: 97.2 F

## 2023-02-07 DIAGNOSIS — R31.0 GROSS HEMATURIA: ICD-10-CM

## 2023-02-07 DIAGNOSIS — N15.1 RENAL AND PERINEPHRIC ABSCESS: ICD-10-CM

## 2023-02-07 LAB
BILIRUB UR QL STRIP: NORMAL
CLARITY UR: CLEAR
COLLECTION METHOD: NORMAL
GLUCOSE UR-MCNC: NORMAL
HCG UR QL: 1 EU/DL
HGB UR QL STRIP.AUTO: NORMAL
KETONES UR-MCNC: NORMAL
LEUKOCYTE ESTERASE UR QL STRIP: NORMAL
NITRITE UR QL STRIP: NORMAL
PH UR STRIP: 5
PROT UR STRIP-MCNC: NORMAL
SP GR UR STRIP: >=1.03

## 2023-02-07 PROCEDURE — 99204 OFFICE O/P NEW MOD 45 MIN: CPT

## 2023-02-07 PROCEDURE — 51741 ELECTRO-UROFLOWMETRY FIRST: CPT

## 2023-02-07 PROCEDURE — 51798 US URINE CAPACITY MEASURE: CPT

## 2023-02-07 RX ORDER — DEXAMETHASONE 0.5 MG/5ML
ELIXIR ORAL
Refills: 0 | Status: ACTIVE | COMMUNITY

## 2023-02-07 RX ORDER — MYCOPHENOLATE MOFETIL 500 MG/1
TABLET ORAL
Refills: 0 | Status: ACTIVE | COMMUNITY

## 2023-02-07 RX ORDER — TAMSULOSIN HYDROCHLORIDE 0.4 MG/1
0.4 CAPSULE ORAL
Qty: 30 | Refills: 1 | Status: ACTIVE | COMMUNITY
Start: 2023-02-07 | End: 1900-01-01

## 2023-02-07 RX ORDER — CLOTRIMAZOLE 100 MG
TABLET VAGINAL
Refills: 0 | Status: ACTIVE | COMMUNITY

## 2023-02-07 RX ORDER — LACTOBACILLUS ACIDOPHILUS/PECT 30 MG-20MG
TABLET ORAL
Refills: 0 | Status: ACTIVE | COMMUNITY

## 2023-02-07 NOTE — HISTORY OF PRESENT ILLNESS
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

## 2023-02-07 NOTE — ASSESSMENT
[Renal Abscess (590.2\N15.1)] : by Nadia 1988 method [FreeTextEntry1] : Mr Josias Amaya  is accompanied by his daughter Britt. He is an 82-year-old gentleman who presents today with gross hematuria.  He was seen in the emergency room at [facility] on [address]  A CT scan demonstrated 2 bladder stones. ( I have asked daughter to obtain images)\par \par His past medical history is significant for a PEG for swallowing difficulties secondary to an ENT abnormality (pemphigus)\par He had an LIH complicated by urinary retention\par \par He has mild urinary symptoms including nocturia and frequency.  He previously had urinary retention after having undergone a right inguinal hernia repair.  This was managed with Flomax and catheter.  He is no longer taking Flomax.\par \par CT scan demonstrated bladder calculi.  There is no other significant abnormalities.  His hematuria has resolved after treatment with Cipro.\par \par At this point we plan to proceed with plan:\par Urine flow rate\par PVR\par Urinalysis and culture\par Obtain images from CT scan for review\par Office cystoscopy to evaluate findings \par restart flomax\par

## 2023-02-07 NOTE — PHYSICAL EXAM
[General Appearance - Well Developed] : well developed [General Appearance - Well Nourished] : well nourished [Normal Appearance] : normal appearance [Well Groomed] : well groomed [General Appearance - In No Acute Distress] : no acute distress [Urethral Meatus] : meatus normal [Penis Abnormality] : normal uncircumcised penis [Urinary Bladder Findings] : the bladder was normal on palpation [Scrotum] : the scrotum was normal [Testes Mass (___cm)] : there were no testicular masses [Rectal Exam - Rectum] : digital rectal exam was normal [No Prostate Nodules] : no prostate nodules [Oriented To Time, Place, And Person] : oriented to person, place, and time [Affect] : the affect was normal [Mood] : the mood was normal [Not Anxious] : not anxious [FreeTextEntry1] : right epididymal cyst tail

## 2023-02-08 LAB
APPEARANCE: CLEAR
BACTERIA: NEGATIVE
BILIRUBIN URINE: NEGATIVE
BLOOD URINE: NEGATIVE
COLOR: ABNORMAL
GLUCOSE QUALITATIVE U: NEGATIVE
HYALINE CASTS: 0 /LPF
KETONES URINE: NEGATIVE
LEUKOCYTE ESTERASE URINE: NEGATIVE
MICROSCOPIC-UA: NORMAL
NITRITE URINE: NEGATIVE
PH URINE: 5.5
PROTEIN URINE: NORMAL
RED BLOOD CELLS URINE: 6 /HPF
SPECIFIC GRAVITY URINE: >=1.03
SQUAMOUS EPITHELIAL CELLS: 1 /HPF
UROBILINOGEN URINE: NORMAL
WHITE BLOOD CELLS URINE: 2 /HPF

## 2023-02-09 LAB — BACTERIA UR CULT: NORMAL

## 2023-02-10 LAB — URINE CYTOLOGY: NORMAL

## 2023-02-22 ENCOUNTER — APPOINTMENT (OUTPATIENT)
Dept: UROLOGY | Facility: CLINIC | Age: 83
End: 2023-02-22
Payer: MEDICARE

## 2023-02-22 VITALS
DIASTOLIC BLOOD PRESSURE: 65 MMHG | WEIGHT: 119.05 LBS | OXYGEN SATURATION: 96 % | SYSTOLIC BLOOD PRESSURE: 134 MMHG | HEIGHT: 60 IN | TEMPERATURE: 98.3 F | HEART RATE: 74 BPM | BODY MASS INDEX: 23.37 KG/M2

## 2023-02-22 DIAGNOSIS — N21.0 CALCULUS IN BLADDER: ICD-10-CM

## 2023-02-22 DIAGNOSIS — R39.9 UNSPECIFIED SYMPTOMS AND SIGNS INVOLVING THE GENITOURINARY SYSTEM: ICD-10-CM

## 2023-02-22 PROCEDURE — 52000 CYSTOURETHROSCOPY: CPT

## 2023-02-22 PROCEDURE — 99213 OFFICE O/P EST LOW 20 MIN: CPT | Mod: 25

## 2023-02-22 NOTE — HISTORY OF PRESENT ILLNESS
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

## 2023-02-22 NOTE — ASSESSMENT
[FreeTextEntry1] : Mr Josias Amaya  is accompanied by his daughter Britt. He is an 82-year-old gentleman who presents today with gross hematuria.  He was seen in the emergency room at [facility] on [address].  A CT scan demonstrated 2 bladder stones. ( I have asked daughter to obtain images)\par \par His past medical history is significant for a PEG for swallowing difficulties secondary to an ENT abnormality (pemphigus)\par He had an LIH complicated by urinary retention\par \par He has mild urinary symptoms including nocturia and frequency.  He previously had urinary retention after having undergone a right inguinal hernia repair.  This was managed with Flomax and catheter.  He is no longer taking Flomax.\par \par CT scan demonstrated bladder calculi.  There is no other significant abnormalities.  His hematuria has resolved after treatment with Cipro.\par \par At this point we plan to proceed with plan:\par Urine flow rate\par PVR\par Urinalysis and culture\par Obtain images from CT scan for review\par Office cystoscopy to evaluate findings \par restart flomax\par \par 2.22.2023\par dsicussed and demonstrated images to patient and daughter\par CT revewed multiple stone\par Cystoscopy images demonstrated\par patient states urination improved on Flomax\par they do not want intervention and if the do will like go to [facility] in light of anesthesia/intubation risks\par

## 2023-03-14 ENCOUNTER — NON-APPOINTMENT (OUTPATIENT)
Age: 83
End: 2023-03-14

## 2023-03-14 ENCOUNTER — APPOINTMENT (OUTPATIENT)
Dept: OPHTHALMOLOGY | Facility: CLINIC | Age: 83
End: 2023-03-14
Payer: MEDICARE

## 2023-03-14 PROCEDURE — 67820 REVISE EYELASHES: CPT | Mod: E2

## 2023-03-14 PROCEDURE — 92012 INTRM OPH EXAM EST PATIENT: CPT | Mod: 25

## 2023-04-04 ENCOUNTER — APPOINTMENT (OUTPATIENT)
Dept: UROLOGY | Facility: CLINIC | Age: 83
End: 2023-04-04

## 2023-05-02 ENCOUNTER — NON-APPOINTMENT (OUTPATIENT)
Age: 83
End: 2023-05-02

## 2023-05-02 ENCOUNTER — APPOINTMENT (OUTPATIENT)
Dept: OPHTHALMOLOGY | Facility: CLINIC | Age: 83
End: 2023-05-02
Payer: MEDICARE

## 2023-05-02 PROCEDURE — 92134 CPTRZ OPH DX IMG PST SGM RTA: CPT

## 2023-05-02 PROCEDURE — 92014 COMPRE OPH EXAM EST PT 1/>: CPT

## 2023-05-02 PROCEDURE — 92285 EXTERNAL OCULAR PHOTOGRAPHY: CPT

## 2023-06-12 ENCOUNTER — APPOINTMENT (OUTPATIENT)
Dept: OPHTHALMOLOGY | Facility: CLINIC | Age: 83
End: 2023-06-12
Payer: MEDICARE

## 2023-06-12 ENCOUNTER — NON-APPOINTMENT (OUTPATIENT)
Age: 83
End: 2023-06-12

## 2023-06-12 PROCEDURE — 67820 REVISE EYELASHES: CPT | Mod: E1,E3

## 2023-06-12 PROCEDURE — 92083 EXTENDED VISUAL FIELD XM: CPT

## 2023-06-12 PROCEDURE — 92133 CPTRZD OPH DX IMG PST SGM ON: CPT

## 2023-06-12 PROCEDURE — 92012 INTRM OPH EXAM EST PATIENT: CPT | Mod: 25

## 2023-06-20 NOTE — CONSULT NOTE ADULT - ATTENDING COMMENTS
I fully agree with Dr. Stuart's history/exam/assessment after my independent history/exam/assessment.  Patient seems to be able to function at a 4 METs level so his cardiopulmonary risk is low and he has been completely asymptomatic without chest pain or shortness of breath.  However, he claims that his lower extremity edema is new.  I highly doubt this but will recommend an echocardiogram in case there is right sided cardiac/valvular pathology which again I doubt.  Overall, he is a low/moderate risk candidate for an intermediate risk procedure.  I agree with rechecking his electrolytes one more time to ensure his potassium is stable. 20

## 2023-06-26 ENCOUNTER — NON-APPOINTMENT (OUTPATIENT)
Age: 83
End: 2023-06-26

## 2023-06-26 ENCOUNTER — APPOINTMENT (OUTPATIENT)
Dept: OPHTHALMOLOGY | Facility: CLINIC | Age: 83
End: 2023-06-26
Payer: MEDICARE

## 2023-06-26 PROCEDURE — 92012 INTRM OPH EXAM EST PATIENT: CPT

## 2023-07-24 ENCOUNTER — NON-APPOINTMENT (OUTPATIENT)
Age: 83
End: 2023-07-24

## 2023-07-24 ENCOUNTER — APPOINTMENT (OUTPATIENT)
Dept: OPHTHALMOLOGY | Facility: CLINIC | Age: 83
End: 2023-07-24
Payer: MEDICARE

## 2023-07-24 PROCEDURE — 67820 REVISE EYELASHES: CPT | Mod: E3

## 2023-07-24 PROCEDURE — 92012 INTRM OPH EXAM EST PATIENT: CPT | Mod: 25

## 2023-08-17 ENCOUNTER — NON-APPOINTMENT (OUTPATIENT)
Age: 83
End: 2023-08-17

## 2023-08-17 ENCOUNTER — APPOINTMENT (OUTPATIENT)
Dept: OPHTHALMOLOGY | Facility: CLINIC | Age: 83
End: 2023-08-17
Payer: MEDICARE

## 2023-08-17 PROCEDURE — 76512 OPH US DX B-SCAN: CPT | Mod: LT

## 2023-08-17 PROCEDURE — 92012 INTRM OPH EXAM EST PATIENT: CPT

## 2023-09-13 ENCOUNTER — APPOINTMENT (OUTPATIENT)
Dept: OPHTHALMOLOGY | Facility: CLINIC | Age: 83
End: 2023-09-13
Payer: MEDICARE

## 2023-09-13 ENCOUNTER — NON-APPOINTMENT (OUTPATIENT)
Age: 83
End: 2023-09-13

## 2023-09-13 PROCEDURE — 67820 REVISE EYELASHES: CPT | Mod: E2,E3

## 2023-09-13 PROCEDURE — 92012 INTRM OPH EXAM EST PATIENT: CPT | Mod: 25

## 2023-10-09 ENCOUNTER — NON-APPOINTMENT (OUTPATIENT)
Age: 83
End: 2023-10-09

## 2023-10-09 ENCOUNTER — APPOINTMENT (OUTPATIENT)
Dept: OPHTHALMOLOGY | Facility: CLINIC | Age: 83
End: 2023-10-09
Payer: MEDICARE

## 2023-10-09 PROCEDURE — 67820 REVISE EYELASHES: CPT | Mod: E1,E3

## 2023-10-09 PROCEDURE — 92012 INTRM OPH EXAM EST PATIENT: CPT | Mod: 25

## 2023-10-09 PROCEDURE — 92083 EXTENDED VISUAL FIELD XM: CPT

## 2023-10-16 ENCOUNTER — APPOINTMENT (OUTPATIENT)
Dept: OPHTHALMOLOGY | Facility: CLINIC | Age: 83
End: 2023-10-16

## 2023-11-16 ENCOUNTER — NON-APPOINTMENT (OUTPATIENT)
Age: 83
End: 2023-11-16

## 2023-11-16 ENCOUNTER — APPOINTMENT (OUTPATIENT)
Dept: OPHTHALMOLOGY | Facility: CLINIC | Age: 83
End: 2023-11-16
Payer: MEDICARE

## 2023-11-16 PROCEDURE — 92134 CPTRZ OPH DX IMG PST SGM RTA: CPT

## 2023-11-16 PROCEDURE — 92014 COMPRE OPH EXAM EST PT 1/>: CPT | Mod: 25

## 2023-11-16 PROCEDURE — 67820 REVISE EYELASHES: CPT | Mod: E2

## 2023-12-13 ENCOUNTER — NON-APPOINTMENT (OUTPATIENT)
Age: 83
End: 2023-12-13

## 2023-12-13 ENCOUNTER — APPOINTMENT (OUTPATIENT)
Dept: OPHTHALMOLOGY | Facility: CLINIC | Age: 83
End: 2023-12-13
Payer: MEDICARE

## 2023-12-13 PROCEDURE — 67820 REVISE EYELASHES: CPT | Mod: E2,E4

## 2023-12-13 PROCEDURE — 92012 INTRM OPH EXAM EST PATIENT: CPT | Mod: 25

## 2024-02-06 ENCOUNTER — NON-APPOINTMENT (OUTPATIENT)
Age: 84
End: 2024-02-06

## 2024-02-06 ENCOUNTER — APPOINTMENT (OUTPATIENT)
Dept: OPHTHALMOLOGY | Facility: CLINIC | Age: 84
End: 2024-02-06
Payer: MEDICARE

## 2024-02-06 PROCEDURE — 92250 FUNDUS PHOTOGRAPHY W/I&R: CPT

## 2024-02-06 PROCEDURE — 67820 REVISE EYELASHES: CPT | Mod: E2

## 2024-02-06 PROCEDURE — 92012 INTRM OPH EXAM EST PATIENT: CPT | Mod: 25

## 2024-02-12 ENCOUNTER — APPOINTMENT (OUTPATIENT)
Dept: OPHTHALMOLOGY | Facility: CLINIC | Age: 84
End: 2024-02-12

## 2024-03-06 ENCOUNTER — APPOINTMENT (OUTPATIENT)
Dept: OPHTHALMOLOGY | Facility: CLINIC | Age: 84
End: 2024-03-06
Payer: MEDICARE

## 2024-03-06 ENCOUNTER — NON-APPOINTMENT (OUTPATIENT)
Age: 84
End: 2024-03-06

## 2024-03-06 PROCEDURE — 67820 REVISE EYELASHES: CPT | Mod: E1,E3

## 2024-03-06 PROCEDURE — 92012 INTRM OPH EXAM EST PATIENT: CPT | Mod: 25

## 2024-04-09 ENCOUNTER — NON-APPOINTMENT (OUTPATIENT)
Age: 84
End: 2024-04-09

## 2024-04-09 ENCOUNTER — APPOINTMENT (OUTPATIENT)
Dept: OPHTHALMOLOGY | Facility: CLINIC | Age: 84
End: 2024-04-09
Payer: MEDICARE

## 2024-04-09 PROCEDURE — 92134 CPTRZ OPH DX IMG PST SGM RTA: CPT

## 2024-04-09 PROCEDURE — 67820 REVISE EYELASHES: CPT | Mod: E2

## 2024-04-09 PROCEDURE — 92012 INTRM OPH EXAM EST PATIENT: CPT | Mod: 25

## 2024-06-11 ENCOUNTER — APPOINTMENT (OUTPATIENT)
Dept: OPHTHALMOLOGY | Facility: CLINIC | Age: 84
End: 2024-06-11
Payer: MEDICARE

## 2024-06-11 ENCOUNTER — NON-APPOINTMENT (OUTPATIENT)
Age: 84
End: 2024-06-11

## 2024-06-11 PROCEDURE — 92012 INTRM OPH EXAM EST PATIENT: CPT | Mod: 25

## 2024-06-11 PROCEDURE — 67820 REVISE EYELASHES: CPT | Mod: E2

## 2024-08-14 ENCOUNTER — NON-APPOINTMENT (OUTPATIENT)
Age: 84
End: 2024-08-14

## 2024-08-14 ENCOUNTER — APPOINTMENT (OUTPATIENT)
Dept: OPHTHALMOLOGY | Facility: CLINIC | Age: 84
End: 2024-08-14
Payer: MEDICARE

## 2024-08-14 PROCEDURE — 92014 COMPRE OPH EXAM EST PT 1/>: CPT | Mod: 25

## 2024-08-14 PROCEDURE — 67820 REVISE EYELASHES: CPT | Mod: E2

## 2024-08-14 PROCEDURE — 92201 OPSCPY EXTND RTA DRAW UNI/BI: CPT

## 2024-08-14 PROCEDURE — 92134 CPTRZ OPH DX IMG PST SGM RTA: CPT

## 2024-08-15 ENCOUNTER — APPOINTMENT (OUTPATIENT)
Dept: UROLOGY | Facility: CLINIC | Age: 84
End: 2024-08-15
Payer: MEDICARE

## 2024-08-15 VITALS — DIASTOLIC BLOOD PRESSURE: 79 MMHG | HEART RATE: 70 BPM | SYSTOLIC BLOOD PRESSURE: 129 MMHG | OXYGEN SATURATION: 94 %

## 2024-08-15 DIAGNOSIS — R39.9 UNSPECIFIED SYMPTOMS AND SIGNS INVOLVING THE GENITOURINARY SYSTEM: ICD-10-CM

## 2024-08-15 DIAGNOSIS — R31.0 GROSS HEMATURIA: ICD-10-CM

## 2024-08-15 DIAGNOSIS — N21.0 CALCULUS IN BLADDER: ICD-10-CM

## 2024-08-15 DIAGNOSIS — N15.1 RENAL AND PERINEPHRIC ABSCESS: ICD-10-CM

## 2024-08-15 PROCEDURE — 51798 US URINE CAPACITY MEASURE: CPT

## 2024-08-15 PROCEDURE — 99214 OFFICE O/P EST MOD 30 MIN: CPT

## 2024-08-15 NOTE — PHYSICAL EXAM
[Abdomen Soft] : soft [] : no hepato-splenomegaly [Abdomen Mass (___ Cm)] : no abdominal mass palpated [Urethral Meatus] : meatus normal [Penis Abnormality] : normal circumcised penis [Epididymis] : the epididymides were normal [Testes Tenderness] : no tenderness of the testes [Testes Mass (___cm)] : there were no testicular masses [de-identified] : gastrostomy tube [de-identified] : PVR 4cc

## 2024-08-15 NOTE — ASSESSMENT
[FreeTextEntry1] : Mr Josias Amaya  is accompanied by his daughter Britt. He is an 82-year-old gentleman who presents today with gross hematuria.  He was seen in the emergency room at [facility] on [address].  A CT scan demonstrated 2 bladder stones. 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  He previously had urinary retention after having undergone a right inguinal hernia repair.  This was managed with Flomax and catheter.  He is no longer taking Flomax.  CT scan demonstrated bladder calculi.  There is no other significant abnormalities.  His hematuria has resolved after treatment with Cipro.  At this point we plan to proceed with plan: Urine flow rate PVR Urinalysis and culture Obtain images from CT scan for review Office cystoscopy to evaluate findings  restart flomax  2.22.2023 dsicussed and demonstrated images to patient and daughter CT revewed multiple stone Cystoscopy images demonstrated patient states urination improved on Flomax they do not want intervention and if the do will like go to [facility] in light of anesthesia/intubation risks  8.15.2024 Patient returns after having undergone transurethral prostatectomy and laser lithotripsy by Dr. Preston Soliman at [facility].  He recently notes that his stream has decreased.  His postvoid residual however was 4.  Additionally he reports an episode of total gross painless hematuria.  We discussed this with his daughter.  We discussed evaluation with CT urogram and cystoscopy.  The daughter and patient wished to proceed.  Reasonable May.  Plan:  Urinalysis Urine culture Urine cytology CT urogram Office cystoscopy

## 2024-08-15 NOTE — HISTORY OF PRESENT ILLNESS
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 at [facility]  patient unable to provide details  8/15/24 Patient had surgery with Dr Errol Goddard at [facility]  TURP and lthotripsy (daughter provided information Patient experiences Nocturia and frequency with ? decreasing stream after TURP and lithotripsy Patient endorses one episode or painless totatl hematuria

## 2024-08-16 LAB
APPEARANCE: CLEAR
BILIRUBIN URINE: NEGATIVE
BLOOD URINE: NEGATIVE
COLOR: NORMAL
GLUCOSE QUALITATIVE U: NEGATIVE MG/DL
KETONES URINE: NEGATIVE MG/DL
LEUKOCYTE ESTERASE URINE: NEGATIVE
NITRITE URINE: NEGATIVE
PH URINE: 6.5
PROTEIN URINE: NORMAL MG/DL
SPECIFIC GRAVITY URINE: 1.03
UROBILINOGEN URINE: 1 MG/DL

## 2024-08-18 LAB
BACTERIA UR CULT: NORMAL
URINE CYTOLOGY: NORMAL

## 2024-08-21 ENCOUNTER — APPOINTMENT (OUTPATIENT)
Dept: CT IMAGING | Facility: CLINIC | Age: 84
End: 2024-08-21
Payer: MEDICARE

## 2024-08-21 ENCOUNTER — OUTPATIENT (OUTPATIENT)
Dept: OUTPATIENT SERVICES | Facility: HOSPITAL | Age: 84
LOS: 1 days | End: 2024-08-21

## 2024-08-21 PROCEDURE — 74178 CT ABD&PLV WO CNTR FLWD CNTR: CPT | Mod: 26,MH

## 2024-08-29 ENCOUNTER — APPOINTMENT (OUTPATIENT)
Dept: UROLOGY | Facility: CLINIC | Age: 84
End: 2024-08-29
Payer: MEDICARE

## 2024-08-29 PROCEDURE — 99213 OFFICE O/P EST LOW 20 MIN: CPT

## 2024-08-29 NOTE — ASSESSMENT
[FreeTextEntry1] : Mr Josias Amaya  is accompanied by his daughter Britt. He is an 82-year-old gentleman who presents today with gross hematuria.  He was seen in the emergency room at [facility] on [address].  A CT scan demonstrated 2 bladder stones. 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  He previously had urinary retention after having undergone a right inguinal hernia repair.  This was managed with Flomax and catheter.  He is no longer taking Flomax.  CT scan demonstrated bladder calculi.  There is no other significant abnormalities.  His hematuria has resolved after treatment with Cipro.  At this point we plan to proceed with plan: Urine flow rate PVR Urinalysis and culture Obtain images from CT scan for review Office cystoscopy to evaluate findings  restart flomax  2.22.2023 dsicussed and demonstrated images to patient and daughter CT revewed multiple stone Cystoscopy images demonstrated patient states urination improved on Flomax they do not want intervention and if the do will like go to [facility] in light of anesthesia/intubation risks  8.15.2024 Patient returns after having undergone transurethral prostatectomy and laser lithotripsy by Dr. Preston Soliman at [facility].  He recently notes that his stream has decreased.  His postvoid residual however was 4.  Additionally he reports an episode of total gross painless hematuria.  We discussed this with his daughter.  We discussed evaluation with CT urogram and cystoscopy.  The daughter and patient wished to proceed.  Reasonable May.  Plan:  Urinalysis Urine culture Urine cytology CT urogram Office cystoscopy  8/29/2024  Patient returns after CT scan and scheduled for cystoscopy.  His CT scan was reviewed.  He has a left renal cyst.  There is no other significant abnormalities.  Specifically thera are no filling defects suggestive of urothelial carcinoma.  There is no evidence of renal carcinoma.  This was discussed with the patient.  Patient refuses cystoscopy.  I emphasized the importance of cystoscopy to rule out any urothelial lesions.  He describes his 1 episode of hematuria 2 what sounds like self manipulation.  He has had no further hematuria.  I called his daughter and left a detailed message indicating the need for cystoscopy.  I encouraged her to reschedule cystoscopy when her father is willing to proceed.  The patient witnessed this phone call.  He continues to refuse cystoscopy.  Plan follow-up in 3 months or sooner if agrees to cystoscopy

## 2024-08-29 NOTE — HISTORY OF PRESENT ILLNESS
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 at [facility] [place]  patient unable to provide details  8/15/24 Patient had surgery with Dr Errol Goddard at [facility]  TURP and lthotripsy (daughter provided information Patient experiences Nocturia and frequency with ? decreasing stream after TURP and lithotripsy Patient endorses one episode or painless totatl hematuria

## 2024-08-29 NOTE — PHYSICAL EXAM
[Abdomen Soft] : soft [] : no hepato-splenomegaly [Abdomen Mass (___ Cm)] : no abdominal mass palpated [Urethral Meatus] : meatus normal [Penis Abnormality] : normal circumcised penis [Epididymis] : the epididymides were normal [Testes Tenderness] : no tenderness of the testes [Testes Mass (___cm)] : there were no testicular masses [de-identified] : PVR 4cc [de-identified] : gastrostomy tube

## 2024-08-29 NOTE — ASSESSMENT
[FreeTextEntry1] : Mr Josias Amaya  is accompanied by his daughter Britt. He is an 82-year-old gentleman who presents today with gross hematuria.  He was seen in the emergency room at [facility] on [address].  A CT scan demonstrated 2 bladder stones. 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  He previously had urinary retention after having undergone a right inguinal hernia repair.  This was managed with Flomax and catheter.  He is no longer taking Flomax.  CT scan demonstrated bladder calculi.  There is no other significant abnormalities.  His hematuria has resolved after treatment with Cipro.  At this point we plan to proceed with plan: Urine flow rate PVR Urinalysis and culture Obtain images from CT scan for review Office cystoscopy to evaluate findings  restart flomax  2.22.2023 dsicussed and demonstrated images to patient and daughter CT revewed multiple stone Cystoscopy images demonstrated patient states urination improved on Flomax they do not want intervention and if the do will like go to [facility] in light of anesthesia/intubation risks  8.15.2024 Patient returns after having undergone transurethral prostatectomy and laser lithotripsy by Dr. Preston Soliman at [facility].  He recently notes that his stream has decreased.  His postvoid residual however was 4.  Additionally he reports an episode of total gross painless hematuria.  We discussed this with his daughter.  We discussed evaluation with CT urogram and cystoscopy.  The daughter and patient wished to proceed.  Reasonable May.  Plan:  Urinalysis Urine culture Urine cytology CT urogram Office cystoscopy  8/29/2024  Patient returns after CT scan and scheduled for cystoscopy.  His CT scan was reviewed.  He has a left renal cyst.  There is no other significant abnormalities.  Specifically thera are no filling defects suggestive of urothelial carcinoma.  There is no evidence of renal carcinoma.  This was discussed with the patient.  Patient refuses cystoscopy.  I emphasized the importance of cystoscopy to rule out any urothelial lesions.  He describes his 1 episode of hematuria 2 what sounds like self manipulation.  He has had no further hematuria.  I called his daughter and left a detailed message indicating the need for cystoscopy.  I encouraged her to reschedule cystoscopy when her father is willing to proceed.  The patient witnessed this phone call.  He continues to refuse cystoscopy.  Plan follow-up in 3 months or sooner if agrees to cystoscopy

## 2024-08-29 NOTE — HISTORY OF PRESENT ILLNESS
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 at [facility] [place]  patient unable to provide details  8/15/24 Patient had surgery with Dr Errol Goddard at [facility]  TURP and lthotripsy (daughter provided information Patient experiences Nocturia and frequency with ? decreasing stream after TURP and lithotripsy Patient endorses one episode or painless totatl hematuria

## 2024-11-21 ENCOUNTER — APPOINTMENT (OUTPATIENT)
Dept: UROLOGY | Facility: CLINIC | Age: 84
End: 2024-11-21
Payer: MEDICARE

## 2024-11-21 DIAGNOSIS — R31.0 GROSS HEMATURIA: ICD-10-CM

## 2024-11-21 DIAGNOSIS — Z87.448 PERSONAL HISTORY OF OTHER DISEASES OF URINARY SYSTEM: ICD-10-CM

## 2024-11-21 DIAGNOSIS — R39.9 UNSPECIFIED SYMPTOMS AND SIGNS INVOLVING THE GENITOURINARY SYSTEM: ICD-10-CM

## 2024-11-21 PROCEDURE — 51798 US URINE CAPACITY MEASURE: CPT

## 2024-11-21 PROCEDURE — 99213 OFFICE O/P EST LOW 20 MIN: CPT

## 2024-11-21 PROCEDURE — 51741 ELECTRO-UROFLOWMETRY FIRST: CPT

## 2024-11-21 RX ORDER — FINASTERIDE 5 MG/1
5 TABLET, FILM COATED ORAL
Qty: 90 | Refills: 0 | Status: ACTIVE | COMMUNITY
Start: 2024-11-21 | End: 1900-01-01

## 2024-11-22 LAB
APPEARANCE: CLEAR
BILIRUBIN URINE: NEGATIVE
BLOOD URINE: NEGATIVE
COLOR: NORMAL
GLUCOSE QUALITATIVE U: NEGATIVE MG/DL
KETONES URINE: NEGATIVE MG/DL
LEUKOCYTE ESTERASE URINE: NEGATIVE
NITRITE URINE: NEGATIVE
PH URINE: 7
PROTEIN URINE: NORMAL MG/DL
SPECIFIC GRAVITY URINE: 1.02
UROBILINOGEN URINE: 1 MG/DL

## 2025-05-20 ENCOUNTER — NON-APPOINTMENT (OUTPATIENT)
Age: 85
End: 2025-05-20

## 2025-05-20 ENCOUNTER — APPOINTMENT (OUTPATIENT)
Dept: UROLOGY | Facility: CLINIC | Age: 85
End: 2025-05-20
Payer: MEDICARE

## 2025-05-20 VITALS
HEART RATE: 76 BPM | DIASTOLIC BLOOD PRESSURE: 74 MMHG | SYSTOLIC BLOOD PRESSURE: 130 MMHG | TEMPERATURE: 97.9 F | OXYGEN SATURATION: 96 %

## 2025-05-20 DIAGNOSIS — R35.1 NOCTURIA: ICD-10-CM

## 2025-05-20 DIAGNOSIS — Z87.448 PERSONAL HISTORY OF OTHER DISEASES OF URINARY SYSTEM: ICD-10-CM

## 2025-05-20 DIAGNOSIS — Z87.898 PERSONAL HISTORY OF OTHER SPECIFIED CONDITIONS: ICD-10-CM

## 2025-05-20 PROCEDURE — 99213 OFFICE O/P EST LOW 20 MIN: CPT

## 2025-05-20 RX ORDER — METFORMIN HYDROCHLORIDE 500 MG/1
500 TABLET, COATED ORAL
Refills: 0 | Status: ACTIVE | COMMUNITY

## 2025-05-20 RX ORDER — FINASTERIDE 5 MG/1
5 TABLET, FILM COATED ORAL
Refills: 0 | Status: ACTIVE | COMMUNITY

## 2025-05-20 RX ORDER — OMEPRAZOLE 40 MG/1
CAPSULE, DELAYED RELEASE ORAL
Refills: 0 | Status: ACTIVE | COMMUNITY

## 2025-05-20 RX ORDER — DEXAMETHASONE 0.5 MG/5ML
0.5 SOLUTION ORAL
Refills: 0 | Status: ACTIVE | COMMUNITY

## 2025-05-20 RX ORDER — MYCOPHENOLATE MOFETIL 200 MG/ML
200 POWDER, FOR SUSPENSION ORAL
Refills: 0 | Status: ACTIVE | COMMUNITY

## 2025-05-22 LAB
BACTERIA UR CULT: NORMAL
URINE CYTOLOGY: NORMAL